# Patient Record
Sex: FEMALE | Race: ASIAN | NOT HISPANIC OR LATINO | URBAN - METROPOLITAN AREA
[De-identification: names, ages, dates, MRNs, and addresses within clinical notes are randomized per-mention and may not be internally consistent; named-entity substitution may affect disease eponyms.]

---

## 2019-01-06 ENCOUNTER — EMERGENCY (EMERGENCY)
Facility: HOSPITAL | Age: 29
LOS: 1 days | Discharge: HOME | End: 2019-01-06
Attending: EMERGENCY MEDICINE
Payer: COMMERCIAL

## 2019-01-06 VITALS
OXYGEN SATURATION: 100 % | DIASTOLIC BLOOD PRESSURE: 71 MMHG | TEMPERATURE: 98 F | HEART RATE: 76 BPM | SYSTOLIC BLOOD PRESSURE: 105 MMHG

## 2019-01-06 VITALS
TEMPERATURE: 98 F | RESPIRATION RATE: 20 BRPM | OXYGEN SATURATION: 100 % | HEART RATE: 75 BPM | DIASTOLIC BLOOD PRESSURE: 79 MMHG | SYSTOLIC BLOOD PRESSURE: 117 MMHG | HEIGHT: 62 IN | WEIGHT: 128.09 LBS

## 2019-01-06 LAB
ALBUMIN SERPL ELPH-MCNC: 4.6 G/DL — SIGNIFICANT CHANGE UP (ref 3.3–5)
ALP SERPL-CCNC: 86 U/L — SIGNIFICANT CHANGE UP (ref 40–120)
ALT FLD-CCNC: 15 U/L — SIGNIFICANT CHANGE UP (ref 10–45)
ANION GAP SERPL CALC-SCNC: 14 MMOL/L — SIGNIFICANT CHANGE UP (ref 5–17)
APPEARANCE UR: CLEAR — SIGNIFICANT CHANGE UP
APTT BLD: 30.1 SEC — SIGNIFICANT CHANGE UP (ref 27.5–36.3)
AST SERPL-CCNC: 13 U/L — SIGNIFICANT CHANGE UP (ref 10–40)
BACTERIA # UR AUTO: NEGATIVE — SIGNIFICANT CHANGE UP
BASOPHILS # BLD AUTO: 0 K/UL — SIGNIFICANT CHANGE UP (ref 0–0.2)
BASOPHILS NFR BLD AUTO: 0.3 % — SIGNIFICANT CHANGE UP (ref 0–2)
BILIRUB SERPL-MCNC: 0.2 MG/DL — SIGNIFICANT CHANGE UP (ref 0.2–1.2)
BILIRUB UR-MCNC: NEGATIVE — SIGNIFICANT CHANGE UP
BLD GP AB SCN SERPL QL: NEGATIVE — SIGNIFICANT CHANGE UP
BUN SERPL-MCNC: 8 MG/DL — SIGNIFICANT CHANGE UP (ref 7–23)
CALCIUM SERPL-MCNC: 9.5 MG/DL — SIGNIFICANT CHANGE UP (ref 8.4–10.5)
CHLORIDE SERPL-SCNC: 101 MMOL/L — SIGNIFICANT CHANGE UP (ref 96–108)
CO2 SERPL-SCNC: 23 MMOL/L — SIGNIFICANT CHANGE UP (ref 22–31)
COLOR SPEC: COLORLESS — SIGNIFICANT CHANGE UP
CREAT SERPL-MCNC: 0.57 MG/DL — SIGNIFICANT CHANGE UP (ref 0.5–1.3)
DIFF PNL FLD: ABNORMAL
EOSINOPHIL # BLD AUTO: 0.5 K/UL — SIGNIFICANT CHANGE UP (ref 0–0.5)
EOSINOPHIL NFR BLD AUTO: 4.7 % — SIGNIFICANT CHANGE UP (ref 0–6)
EPI CELLS # UR: 3 /HPF — SIGNIFICANT CHANGE UP
GLUCOSE SERPL-MCNC: 88 MG/DL — SIGNIFICANT CHANGE UP (ref 70–99)
GLUCOSE UR QL: NEGATIVE — SIGNIFICANT CHANGE UP
HCG SERPL-ACNC: 4622 MIU/ML — HIGH
HCG UR QL: POSITIVE
HCT VFR BLD CALC: 34.5 % — SIGNIFICANT CHANGE UP (ref 34.5–45)
HGB BLD-MCNC: 11.9 G/DL — SIGNIFICANT CHANGE UP (ref 11.5–15.5)
HYALINE CASTS # UR AUTO: 2 /LPF — SIGNIFICANT CHANGE UP (ref 0–2)
INR BLD: 0.95 RATIO — SIGNIFICANT CHANGE UP (ref 0.88–1.16)
KETONES UR-MCNC: NEGATIVE — SIGNIFICANT CHANGE UP
LEUKOCYTE ESTERASE UR-ACNC: ABNORMAL
LYMPHOCYTES # BLD AUTO: 3.3 K/UL — SIGNIFICANT CHANGE UP (ref 1–3.3)
LYMPHOCYTES # BLD AUTO: 34.3 % — SIGNIFICANT CHANGE UP (ref 13–44)
MCHC RBC-ENTMCNC: 31.4 PG — SIGNIFICANT CHANGE UP (ref 27–34)
MCHC RBC-ENTMCNC: 34.3 GM/DL — SIGNIFICANT CHANGE UP (ref 32–36)
MCV RBC AUTO: 91.5 FL — SIGNIFICANT CHANGE UP (ref 80–100)
MONOCYTES # BLD AUTO: 0.7 K/UL — SIGNIFICANT CHANGE UP (ref 0–0.9)
MONOCYTES NFR BLD AUTO: 7 % — SIGNIFICANT CHANGE UP (ref 2–14)
NEUTROPHILS # BLD AUTO: 5.2 K/UL — SIGNIFICANT CHANGE UP (ref 1.8–7.4)
NEUTROPHILS NFR BLD AUTO: 53.7 % — SIGNIFICANT CHANGE UP (ref 43–77)
NITRITE UR-MCNC: NEGATIVE — SIGNIFICANT CHANGE UP
PH UR: 6 — SIGNIFICANT CHANGE UP (ref 5–8)
PLATELET # BLD AUTO: 231 K/UL — SIGNIFICANT CHANGE UP (ref 150–400)
POTASSIUM SERPL-MCNC: 4 MMOL/L — SIGNIFICANT CHANGE UP (ref 3.5–5.3)
POTASSIUM SERPL-SCNC: 4 MMOL/L — SIGNIFICANT CHANGE UP (ref 3.5–5.3)
PROT SERPL-MCNC: 7.6 G/DL — SIGNIFICANT CHANGE UP (ref 6–8.3)
PROT UR-MCNC: NEGATIVE — SIGNIFICANT CHANGE UP
PROTHROM AB SERPL-ACNC: 10.9 SEC — SIGNIFICANT CHANGE UP (ref 10–12.9)
RBC # BLD: 3.77 M/UL — LOW (ref 3.8–5.2)
RBC # FLD: 12 % — SIGNIFICANT CHANGE UP (ref 10.3–14.5)
RBC CASTS # UR COMP ASSIST: 2 /HPF — SIGNIFICANT CHANGE UP (ref 0–4)
RH IG SCN BLD-IMP: POSITIVE — SIGNIFICANT CHANGE UP
SODIUM SERPL-SCNC: 138 MMOL/L — SIGNIFICANT CHANGE UP (ref 135–145)
SP GR SPEC: 1 — LOW (ref 1.01–1.02)
UROBILINOGEN FLD QL: NEGATIVE — SIGNIFICANT CHANGE UP
WBC # BLD: 9.6 K/UL — SIGNIFICANT CHANGE UP (ref 3.8–10.5)
WBC # FLD AUTO: 9.6 K/UL — SIGNIFICANT CHANGE UP (ref 3.8–10.5)
WBC UR QL: 12 /HPF — HIGH (ref 0–5)

## 2019-01-06 PROCEDURE — 86901 BLOOD TYPING SEROLOGIC RH(D): CPT

## 2019-01-06 PROCEDURE — 99284 EMERGENCY DEPT VISIT MOD MDM: CPT

## 2019-01-06 PROCEDURE — 85730 THROMBOPLASTIN TIME PARTIAL: CPT

## 2019-01-06 PROCEDURE — 81025 URINE PREGNANCY TEST: CPT

## 2019-01-06 PROCEDURE — 76817 TRANSVAGINAL US OBSTETRIC: CPT | Mod: 26

## 2019-01-06 PROCEDURE — 86850 RBC ANTIBODY SCREEN: CPT

## 2019-01-06 PROCEDURE — 76817 TRANSVAGINAL US OBSTETRIC: CPT

## 2019-01-06 PROCEDURE — 80053 COMPREHEN METABOLIC PANEL: CPT

## 2019-01-06 PROCEDURE — 85610 PROTHROMBIN TIME: CPT

## 2019-01-06 PROCEDURE — 86900 BLOOD TYPING SEROLOGIC ABO: CPT

## 2019-01-06 PROCEDURE — 84702 CHORIONIC GONADOTROPIN TEST: CPT

## 2019-01-06 PROCEDURE — 87086 URINE CULTURE/COLONY COUNT: CPT

## 2019-01-06 PROCEDURE — 81001 URINALYSIS AUTO W/SCOPE: CPT

## 2019-01-06 PROCEDURE — 85027 COMPLETE CBC AUTOMATED: CPT

## 2019-01-06 RX ORDER — CEPHALEXIN 500 MG
500 CAPSULE ORAL ONCE
Qty: 0 | Refills: 0 | Status: COMPLETED | OUTPATIENT
Start: 2019-01-06 | End: 2019-01-06

## 2019-01-06 RX ORDER — CEPHALEXIN 500 MG
1 CAPSULE ORAL
Qty: 14 | Refills: 0
Start: 2019-01-06 | End: 2019-01-12

## 2019-01-06 RX ADMIN — Medication 500 MILLIGRAM(S): at 20:22

## 2019-01-06 NOTE — ED PROVIDER NOTE - OBJECTIVE STATEMENT
27 y/o F  6 weeks pregnant by positive home pregnancy test, presenting for concern for spotting 2 days ago and spotting today. States that she does not know exactly when her lmp was because her periods are very irregular; however, she does know that she had a negative pregnancy test on 12/3 and a positive pregnancy test on . She reports that she has had her hcg checked twice since her positive pregnancy test and it is appropriately doubling, and had an US on  for dating; however, does not have the results of it yet. She states spotting was 'only a few drops' 2 days ago and again today 'even less.' She denies any lightheadedness, nausea, vomiting. Denies any pelvic pain but reports some mild lower back pain.

## 2019-01-06 NOTE — ED ADULT NURSE NOTE - OBJECTIVE STATEMENT
Pt is a 27 yo female  6 weeks pregnant by positive home pregnancy test, presenting for concern for spotting 2 days ago and spotting today. States that she does not know exactly when her lmp was because her periods are very irregular; however, she does know that she had a negative pregnancy test on 12/3 and a positive pregnancy test on . She reports that she has had her hcg checked twice since her positive pregnancy test and it is appropriately doubling, and had an US on  for dating; however, does not have the results of it yet. She states spotting was 'only a few drops' 2 days ago and again today 'even less.' She denies any lightheadedness, nausea, vomiting. Denies any pelvic pain but reports some mild lower back pain. Pt is a 27 yo female  who is estimated to be about 6 weeks pregnant by positive home pregnancy test, Pt states that two days ago she had light spotting which self resolved and then it returned again today. Pt denies any pain to tenderness no Cp or SOB no N/V/D no cough fever or chills no dizziness or headaches, Pt unsure of her lmp because her periods are very irregular; however, she does know that she had a negative pregnancy test on 12/3 and a positive pregnancy test on . She reports that she has had her hcg checked twice since her positive pregnancy test and it is appropriately doubling,

## 2019-01-06 NOTE — ED PROVIDER NOTE - NSFOLLOWUPINSTRUCTIONS_ED_ALL_ED_FT
You were seen in the ED for vaginal bleeding, your ultrasound suggests likely a very early pregnancy. You need to follow up with your OBGYN for a re-evaluation in 2 days.     Please complete the antibiotics as prescribed.    Return to the ED immediately if you are soaking through more than 1 pad per hour, feel lightheaded, feel like you are about to pass out or develop chest pain or trouble breathing. These are signs of life-threatening bleeding that would warrant immediate evaluation. Please follow up with your regular doctor or gynecologist in the next 1-2 days to ensure your bleeding is improving.

## 2019-01-06 NOTE — ED PROVIDER NOTE - ATTENDING CONTRIBUTION TO CARE
Attending MD Neff:  I personally have seen and examined this patient.  Resident note reviewed and agree on plan of care and except where noted.  See HPI, PE, and MDM for details.       Attending MD Neff:    Gen:  well appearing, oriented x 3  Neck: supple, no swelling, trachea midline  CV: heart with reg rhythm, no obvious murmur appreciated   Resp: CTAB, breathing comfortably  Abd: soft, NT, ND  Extremities: extremities warm to the touch, no peripheral edema   Msk: no extremity deformities or bony tenderness  Pysch: appropriate affect    Neuro: moves all extremities spontaneously, no gross motor or sensory deficits       29 y/o F  6 weeks pregnant presenting with vaginal bleeding, plan for labs, TVUS to eval IUP ro ectopic Attending MD Neff:   I personally have seen and examined this patient.  Physician assistant note reviewed and agree on plan of care and except where noted.  See HPI, PE, and MDM for details.      Attending MD Neff:    Gen:  well appearing, oriented x 3  Neck: supple, no swelling, trachea midline  CV: heart with reg rhythm, no obvious murmur appreciated   Resp: CTAB, breathing comfortably  Abd: soft, NT, ND  Extremities: extremities warm to the touch, no peripheral edema   Msk: no extremity deformities or bony tenderness  Pysch: appropriate affect    Neuro: moves all extremities spontaneously, no gross motor or sensory deficits       27 y/o F  6 weeks pregnant presenting with vaginal bleeding, plan for labs, TVUS to eval IUP ro ectopic

## 2019-01-06 NOTE — ED PROVIDER NOTE - NSFOLLOWUPCLINICS_GEN_ALL_ED_FT
[facility]  Gynceology/OB  [address] [place] [address]  Phone: [phone]  Fax:   Follow Up Time: 1-3 Days

## 2019-01-06 NOTE — ED ADULT NURSE NOTE - NSIMPLEMENTINTERV_GEN_ALL_ED
Implemented All Universal Safety Interventions:  [place] to call system. Call bell, personal items and telephone within reach. Instruct patient to call for assistance. Room bathroom lighting operational. Non-slip footwear when patient is off stretcher. Physically safe environment: no spills, clutter or unnecessary equipment. Stretcher in lowest position, wheels locked, appropriate side rails in place.

## 2019-01-06 NOTE — ED PROVIDER NOTE - NS ED ROS FT
Constitutional: No fever or chills  Eyes: No visual changes, eye pain or redness  HEENT: No throat pain, ear pain, nasal pain. No nose bleeding.  CV: No chest pain or lower extremity edema  Resp: No SOB no cough  GI: No abd pain. No nausea or vomiting. No diarrhea. No constipation.   : No dysuria, hematuria. vaginal spotting  MSK: No musculoskeletal pain  Skin: No rash  Neuro: No headache. No numbness or tingling. No weakness.

## 2019-01-07 LAB
CULTURE RESULTS: NO GROWTH — SIGNIFICANT CHANGE UP
SPECIMEN SOURCE: SIGNIFICANT CHANGE UP

## 2019-01-08 ENCOUNTER — APPOINTMENT (OUTPATIENT)
Dept: OBGYN | Facility: CLINIC | Age: 29
End: 2019-01-08
Payer: COMMERCIAL

## 2019-01-08 VITALS
SYSTOLIC BLOOD PRESSURE: 100 MMHG | HEIGHT: 62 IN | BODY MASS INDEX: 23.6 KG/M2 | DIASTOLIC BLOOD PRESSURE: 60 MMHG | WEIGHT: 128.25 LBS

## 2019-01-08 DIAGNOSIS — Z83.3 FAMILY HISTORY OF DIABETES MELLITUS: ICD-10-CM

## 2019-01-08 DIAGNOSIS — Z00.00 ENCOUNTER FOR GENERAL ADULT MEDICAL EXAMINATION W/OUT ABNORMAL FINDINGS: ICD-10-CM

## 2019-01-08 DIAGNOSIS — Z82.49 FAMILY HISTORY OF ISCHEMIC HEART DISEASE AND OTHER DISEASES OF THE CIRCULATORY SYSTEM: ICD-10-CM

## 2019-01-08 DIAGNOSIS — E78.00 PURE HYPERCHOLESTEROLEMIA, UNSPECIFIED: ICD-10-CM

## 2019-01-08 DIAGNOSIS — Z78.9 OTHER SPECIFIED HEALTH STATUS: ICD-10-CM

## 2019-01-08 DIAGNOSIS — Z83.2 FAMILY HISTORY OF DISEASES OF THE BLOOD AND BLOOD-FORMING ORGANS AND CERTAIN DISORDERS INVOLVING THE IMMUNE MECHANISM: ICD-10-CM

## 2019-01-08 LAB
HCG UR QL: POSITIVE
QUALITY CONTROL: NORMAL

## 2019-01-08 PROCEDURE — 99203 OFFICE O/P NEW LOW 30 MIN: CPT

## 2019-01-08 RX ORDER — PRENATAL WITH FERROUS FUM AND FOLIC ACID 3080; 920; 120; 400; 22; 1.84; 3; 20; 10; 1; 12; 200; 27; 25; 2 [IU]/1; [IU]/1; MG/1; [IU]/1; MG/1; MG/1; MG/1; MG/1; MG/1; MG/1; UG/1; MG/1; MG/1; MG/1; MG/1
27-1 TABLET ORAL
Refills: 0 | Status: ACTIVE | COMMUNITY

## 2019-01-08 RX ORDER — ACETAMINOPHEN 325 MG
TABLET ORAL
Refills: 0 | Status: ACTIVE | COMMUNITY

## 2019-01-08 NOTE — PHYSICAL EXAM
[Awake] : awake [Alert] : alert [Acute Distress] : no acute distress [LAD] : no lymphadenopathy [Mass] : no breast mass [Nipple Discharge] : no nipple discharge [Axillary LAD] : no axillary lymphadenopathy [Soft] : soft [Tender] : non tender [Oriented x3] : oriented to person, place, and time [Normal] : uterus [Scant] : there was scant vaginal bleeding [Motion Tenderness] : there was no cervical motion tenderness [Uterine Adnexae] : were not tender and not enlarged [Adnexa Tenderness] : were not tender [Ovarian Mass (___ Cm)] : there were no adnexal masses

## 2019-01-09 LAB
ABO + RH PNL BLD: NORMAL
BLD GP AB SCN SERPL QL: NORMAL
C TRACH RRNA SPEC QL NAA+PROBE: NOT DETECTED
N GONORRHOEA RRNA SPEC QL NAA+PROBE: NOT DETECTED
PROGEST SERPL-MCNC: 3.3 NG/ML
SOURCE AMPLIFICATION: NORMAL

## 2019-01-11 LAB — HCG SERPL-MCNC: 4347 MIU/ML

## 2019-01-24 ENCOUNTER — APPOINTMENT (OUTPATIENT)
Dept: OBGYN | Facility: CLINIC | Age: 29
End: 2019-01-24
Payer: COMMERCIAL

## 2019-01-24 ENCOUNTER — TRANSCRIPTION ENCOUNTER (OUTPATIENT)
Age: 29
End: 2019-01-24

## 2019-01-24 VITALS
SYSTOLIC BLOOD PRESSURE: 112 MMHG | WEIGHT: 128 LBS | HEIGHT: 62 IN | DIASTOLIC BLOOD PRESSURE: 72 MMHG | BODY MASS INDEX: 23.55 KG/M2

## 2019-01-24 PROCEDURE — 99214 OFFICE O/P EST MOD 30 MIN: CPT

## 2019-01-24 NOTE — PROCEDURE
[Intrauterine Pregnancy] : no intrauterine pregnancy [Yolk Sac] : no yolk sac [Fetal Heart] : no fetal heart [FreeTextEntry1] : small debris and fluid\par endo stirp max w fluid 7mm

## 2019-01-24 NOTE — PHYSICAL EXAM
[Awake] : awake [Alert] : alert [Acute Distress] : no acute distress [Soft] : soft [Tender] : non tender [Oriented x3] : oriented to person, place, and time [Normal] : uterus [No Bleeding] : there was no active vaginal bleeding [Tenderness] : nontender [Uterine Adnexae] : were not tender and not enlarged [Adnexa Tenderness] : were not tender [FreeTextEntry5] : small tissue--macerated at os removed (old w clot)

## 2019-01-25 LAB — HCG SERPL-MCNC: 737 MIU/ML

## 2019-01-28 ENCOUNTER — RESULT REVIEW (OUTPATIENT)
Age: 29
End: 2019-01-28

## 2019-01-28 ENCOUNTER — RESULT CHARGE (OUTPATIENT)
Age: 29
End: 2019-01-28

## 2019-05-22 LAB
ESTIMATED AVERAGE GLUCOSE: 108 MG/DL
ESTRADIOL SERPL-MCNC: 24 PG/ML
FSH SERPL-MCNC: 8 IU/L
HBA1C MFR BLD HPLC: 5.4 %
LH SERPL-ACNC: 7.4 IU/L
PROLACTIN SERPL-MCNC: 21.6 NG/ML
TESTOST SERPL-MCNC: 20.7 NG/DL

## 2019-05-28 ENCOUNTER — TRANSCRIPTION ENCOUNTER (OUTPATIENT)
Age: 29
End: 2019-05-28

## 2019-06-04 LAB — DHEA-SULFATE, SERUM: 223 UG/DL

## 2019-06-18 RX ORDER — MISOPROSTOL 200 UG/1
200 TABLET ORAL
Qty: 4 | Refills: 0 | Status: COMPLETED | COMMUNITY
Start: 2019-01-24 | End: 2019-06-18

## 2019-06-24 ENCOUNTER — CHART COPY (OUTPATIENT)
Age: 29
End: 2019-06-24

## 2019-06-30 LAB
HCG SERPL-MCNC: ABNORMAL MIU/ML
PROGEST SERPL-MCNC: 20.3 NG/ML

## 2019-07-03 ENCOUNTER — LABORATORY RESULT (OUTPATIENT)
Age: 29
End: 2019-07-03

## 2019-07-03 ENCOUNTER — APPOINTMENT (OUTPATIENT)
Dept: OBGYN | Facility: CLINIC | Age: 29
End: 2019-07-03
Payer: COMMERCIAL

## 2019-07-03 VITALS
BODY MASS INDEX: 23.03 KG/M2 | WEIGHT: 125.13 LBS | DIASTOLIC BLOOD PRESSURE: 83 MMHG | HEART RATE: 91 BPM | SYSTOLIC BLOOD PRESSURE: 122 MMHG | HEIGHT: 62 IN

## 2019-07-03 LAB
BILIRUB UR QL STRIP: NORMAL
GLUCOSE UR-MCNC: NORMAL
HCG UR QL: 0.2 EU/DL
HGB UR QL STRIP.AUTO: NORMAL
KETONES UR-MCNC: NORMAL
LEUKOCYTE ESTERASE UR QL STRIP: NORMAL
NITRITE UR QL STRIP: NORMAL
PH UR STRIP: 5.5
PROT UR STRIP-MCNC: NORMAL
SP GR UR STRIP: 1.01

## 2019-07-03 PROCEDURE — 81002 URINALYSIS NONAUTO W/O SCOPE: CPT

## 2019-07-03 PROCEDURE — 99214 OFFICE O/P EST MOD 30 MIN: CPT

## 2019-07-03 NOTE — PHYSICAL EXAM
[Awake] : awake [Alert] : alert [Soft] : soft [Oriented x3] : oriented to person, place, and time [Normal] : uterus [Uterine Adnexae] : were not tender and not enlarged [No Bleeding] : there was no active vaginal bleeding [Acute Distress] : no acute distress [Tender] : non tender

## 2019-07-07 LAB
ABO + RH PNL BLD: NORMAL
ALBUMIN SERPL ELPH-MCNC: 4.7 G/DL
ALP BLD-CCNC: 75 U/L
ALT SERPL-CCNC: 12 U/L
ANION GAP SERPL CALC-SCNC: 14 MMOL/L
AST SERPL-CCNC: 15 U/L
B19V IGG SER QL IA: 0.2 INDEX
B19V IGG+IGM SER-IMP: NEGATIVE
B19V IGG+IGM SER-IMP: NORMAL
B19V IGM FLD-ACNC: 0.4
B19V IGM SER-ACNC: NEGATIVE
BACTERIA UR CULT: NORMAL
BASOPHILS # BLD AUTO: 0.03 K/UL
BASOPHILS NFR BLD AUTO: 0.3 %
BILIRUB SERPL-MCNC: 0.3 MG/DL
BLD GP AB SCN SERPL QL: NORMAL
BUN SERPL-MCNC: 8 MG/DL
C TRACH RRNA SPEC QL NAA+PROBE: NOT DETECTED
CALCIUM SERPL-MCNC: 9.7 MG/DL
CHLORIDE SERPL-SCNC: 102 MMOL/L
CMV IGG SERPL QL: 7.9 U/ML
CMV IGG SERPL-IMP: POSITIVE
CO2 SERPL-SCNC: 22 MMOL/L
CREAT SERPL-MCNC: 0.59 MG/DL
EOSINOPHIL # BLD AUTO: 0.09 K/UL
EOSINOPHIL NFR BLD AUTO: 0.8 %
ESTIMATED AVERAGE GLUCOSE: 105 MG/DL
GLUCOSE SERPL-MCNC: 86 MG/DL
HBA1C MFR BLD HPLC: 5.3 %
HBV SURFACE AG SER QL: NONREACTIVE
HCT VFR BLD CALC: 36.4 %
HCV AB SER QL: NONREACTIVE
HCV S/CO RATIO: 0.2 S/CO
HGB BLD-MCNC: 11.3 G/DL
HIV1+2 AB SPEC QL IA.RAPID: NONREACTIVE
IMM GRANULOCYTES NFR BLD AUTO: 0.3 %
LEAD BLD-MCNC: 1 UG/DL
LYMPHOCYTES # BLD AUTO: 1.53 K/UL
LYMPHOCYTES NFR BLD AUTO: 12.8 %
MAN DIFF?: NORMAL
MCHC RBC-ENTMCNC: 30.3 PG
MCHC RBC-ENTMCNC: 31 GM/DL
MCV RBC AUTO: 97.6 FL
MEV IGG FLD QL IA: >300 AU/ML
MEV IGG+IGM SER-IMP: POSITIVE
MONOCYTES # BLD AUTO: 0.69 K/UL
MONOCYTES NFR BLD AUTO: 5.8 %
N GONORRHOEA RRNA SPEC QL NAA+PROBE: NOT DETECTED
NEUTROPHILS # BLD AUTO: 9.54 K/UL
NEUTROPHILS NFR BLD AUTO: 80 %
PLATELET # BLD AUTO: 246 K/UL
POTASSIUM SERPL-SCNC: 4.5 MMOL/L
PROT SERPL-MCNC: 7.6 G/DL
RBC # BLD: 3.73 M/UL
RBC # FLD: 12.9 %
RUBV IGG FLD-ACNC: 6.5 INDEX
RUBV IGG SER-IMP: POSITIVE
SODIUM SERPL-SCNC: 138 MMOL/L
SOURCE AMPLIFICATION: NORMAL
T PALLIDUM AB SER QL IA: NEGATIVE
VZV AB TITR SER: POSITIVE
VZV IGG SER IF-ACNC: 1209 INDEX
WBC # FLD AUTO: 11.92 K/UL

## 2019-07-08 ENCOUNTER — OTHER (OUTPATIENT)
Age: 29
End: 2019-07-08

## 2019-07-08 ENCOUNTER — LABORATORY RESULT (OUTPATIENT)
Age: 29
End: 2019-07-08

## 2019-07-08 ENCOUNTER — MOBILE ON CALL (OUTPATIENT)
Age: 29
End: 2019-07-08

## 2019-07-12 DIAGNOSIS — E03.9 HYPOTHYROIDISM, UNSPECIFIED: ICD-10-CM

## 2019-07-12 LAB
AR GENE MUT ANL BLD/T: NEGATIVE
CFTR MUT TESTED BLD/T: NEGATIVE
FMR1 GENE MUT ANL BLD/T: NORMAL
HGB A MFR BLD: 97.4 %
HGB A2 MFR BLD: 2.6 %
HGB FRACT BLD-IMP: NORMAL
T3 SERPL-MCNC: 151 NG/DL
T3FREE SERPL-MCNC: 3.12 PG/ML
T3RU NFR SERPL: 1.2 TBI
T4 FREE SERPL-MCNC: 1.1 NG/DL
T4 SERPL-MCNC: 8.3 UG/DL
THYROGLOB AB SERPL-ACNC: <20 IU/ML
THYROPEROXIDASE AB SERPL IA-ACNC: <10 IU/ML
TSH SERPL-ACNC: 3.69 UIU/ML
TSH SERPL-ACNC: 5.07 UIU/ML

## 2019-07-12 RX ORDER — LEVOTHYROXINE SODIUM 0.03 MG/1
25 TABLET ORAL DAILY
Qty: 90 | Refills: 2 | Status: ACTIVE | COMMUNITY
Start: 2019-07-12 | End: 1900-01-01

## 2019-08-01 ENCOUNTER — APPOINTMENT (OUTPATIENT)
Dept: OBGYN | Facility: CLINIC | Age: 29
End: 2019-08-01
Payer: COMMERCIAL

## 2019-08-01 VITALS
HEIGHT: 62 IN | SYSTOLIC BLOOD PRESSURE: 114 MMHG | BODY MASS INDEX: 23.79 KG/M2 | WEIGHT: 129.25 LBS | DIASTOLIC BLOOD PRESSURE: 76 MMHG

## 2019-08-01 PROCEDURE — ZZZZZ: CPT

## 2019-08-14 ENCOUNTER — ASOB RESULT (OUTPATIENT)
Age: 29
End: 2019-08-14

## 2019-08-14 ENCOUNTER — APPOINTMENT (OUTPATIENT)
Dept: ANTEPARTUM | Facility: CLINIC | Age: 29
End: 2019-08-14
Payer: COMMERCIAL

## 2019-08-14 PROCEDURE — 76813 OB US NUCHAL MEAS 1 GEST: CPT

## 2019-08-14 PROCEDURE — 36416 COLLJ CAPILLARY BLOOD SPEC: CPT

## 2019-08-14 PROCEDURE — 76801 OB US < 14 WKS SINGLE FETUS: CPT

## 2019-08-29 ENCOUNTER — OTHER (OUTPATIENT)
Age: 29
End: 2019-08-29

## 2019-09-03 ENCOUNTER — APPOINTMENT (OUTPATIENT)
Dept: OBGYN | Facility: CLINIC | Age: 29
End: 2019-09-03

## 2019-09-03 ENCOUNTER — NON-APPOINTMENT (OUTPATIENT)
Age: 29
End: 2019-09-03

## 2019-09-11 ENCOUNTER — APPOINTMENT (OUTPATIENT)
Dept: OBGYN | Facility: CLINIC | Age: 29
End: 2019-09-11
Payer: COMMERCIAL

## 2019-09-11 ENCOUNTER — NON-APPOINTMENT (OUTPATIENT)
Age: 29
End: 2019-09-11

## 2019-09-11 VITALS — DIASTOLIC BLOOD PRESSURE: 72 MMHG | BODY MASS INDEX: 22.5 KG/M2 | WEIGHT: 123 LBS | SYSTOLIC BLOOD PRESSURE: 109 MMHG

## 2019-09-11 PROCEDURE — 0502F SUBSEQUENT PRENATAL CARE: CPT

## 2019-09-11 RX ORDER — CLOTRIMAZOLE AND BETAMETHASONE DIPROPIONATE 10; .5 MG/G; MG/G
1-0.05 CREAM TOPICAL TWICE DAILY
Qty: 1 | Refills: 1 | Status: ACTIVE | COMMUNITY
Start: 2019-09-11 | End: 1900-01-01

## 2019-09-16 LAB
1ST TRIMESTER DATA: NORMAL
2ND TRIMESTER DATA: NORMAL
AFP PNL SERPL: NORMAL
AFP SERPL-ACNC: NORMAL
AFP SERPL-ACNC: NORMAL
B-HCG FREE SERPL-MCNC: NORMAL
CLINICAL BIOCHEMIST REVIEW: NORMAL
FREE BETA HCG 1ST TRIMESTER: NORMAL
INHIBIN A SERPL-MCNC: NORMAL
NOTES NTD: NORMAL
NT: NORMAL
PAPP-A SERPL-ACNC: NORMAL
U ESTRIOL SERPL-SCNC: NORMAL

## 2019-09-23 LAB
CLARI ADDITIONAL INFO: NORMAL
CLARI CHROMOSOME 13: NORMAL
CLARI CHROMOSOME 18: NORMAL
CLARI CHROMOSOME 21: NORMAL
CLARI SEX CHROMOSOMES: NORMAL
CLARITEST NIPT: NORMAL

## 2019-10-09 ENCOUNTER — APPOINTMENT (OUTPATIENT)
Dept: OBGYN | Facility: CLINIC | Age: 29
End: 2019-10-09
Payer: COMMERCIAL

## 2019-10-09 ENCOUNTER — MED ADMIN CHARGE (OUTPATIENT)
Age: 29
End: 2019-10-09

## 2019-10-09 VITALS
WEIGHT: 128 LBS | DIASTOLIC BLOOD PRESSURE: 58 MMHG | BODY MASS INDEX: 23.55 KG/M2 | HEIGHT: 62 IN | SYSTOLIC BLOOD PRESSURE: 100 MMHG

## 2019-10-09 PROCEDURE — 90471 IMMUNIZATION ADMIN: CPT

## 2019-10-09 PROCEDURE — 0502F SUBSEQUENT PRENATAL CARE: CPT

## 2019-10-09 PROCEDURE — 90686 IIV4 VACC NO PRSV 0.5 ML IM: CPT

## 2019-10-09 RX ORDER — TERCONAZOLE 8 MG/G
0.8 CREAM VAGINAL
Qty: 1 | Refills: 2 | Status: ACTIVE | COMMUNITY
Start: 2019-10-09 | End: 1900-01-01

## 2019-10-10 ENCOUNTER — APPOINTMENT (OUTPATIENT)
Dept: ANTEPARTUM | Facility: CLINIC | Age: 29
End: 2019-10-10
Payer: COMMERCIAL

## 2019-10-10 ENCOUNTER — ASOB RESULT (OUTPATIENT)
Age: 29
End: 2019-10-10

## 2019-10-10 ENCOUNTER — APPOINTMENT (OUTPATIENT)
Dept: ANTEPARTUM | Facility: CLINIC | Age: 29
End: 2019-10-10

## 2019-10-10 PROCEDURE — 76817 TRANSVAGINAL US OBSTETRIC: CPT

## 2019-10-10 PROCEDURE — 76811 OB US DETAILED SNGL FETUS: CPT

## 2019-10-13 ENCOUNTER — NON-APPOINTMENT (OUTPATIENT)
Age: 29
End: 2019-10-13

## 2019-11-06 ENCOUNTER — APPOINTMENT (OUTPATIENT)
Dept: OBGYN | Facility: CLINIC | Age: 29
End: 2019-11-06

## 2019-11-18 ENCOUNTER — APPOINTMENT (OUTPATIENT)
Dept: OBGYN | Facility: CLINIC | Age: 29
End: 2019-11-18
Payer: COMMERCIAL

## 2019-11-18 VITALS
DIASTOLIC BLOOD PRESSURE: 62 MMHG | SYSTOLIC BLOOD PRESSURE: 110 MMHG | WEIGHT: 145 LBS | HEIGHT: 62 IN | BODY MASS INDEX: 26.68 KG/M2

## 2019-11-18 PROCEDURE — 0502F SUBSEQUENT PRENATAL CARE: CPT

## 2019-11-19 ENCOUNTER — TRANSCRIPTION ENCOUNTER (OUTPATIENT)
Age: 29
End: 2019-11-19

## 2019-11-19 ENCOUNTER — ASOB RESULT (OUTPATIENT)
Age: 29
End: 2019-11-19

## 2019-11-19 ENCOUNTER — APPOINTMENT (OUTPATIENT)
Dept: MATERNAL FETAL MEDICINE | Facility: CLINIC | Age: 29
End: 2019-11-19
Payer: COMMERCIAL

## 2019-11-19 DIAGNOSIS — Z87.59 PERSONAL HISTORY OF OTHER COMPLICATIONS OF PREGNANCY, CHILDBIRTH AND THE PUERPERIUM: ICD-10-CM

## 2019-11-19 DIAGNOSIS — O02.1 MISSED ABORTION: ICD-10-CM

## 2019-11-19 DIAGNOSIS — N76.2 ACUTE VULVITIS: ICD-10-CM

## 2019-11-19 DIAGNOSIS — O02.0 BLIGHTED OVUM AND NONHYDATIDIFORM MOLE: ICD-10-CM

## 2019-11-19 PROCEDURE — G0109 DIAB MANAGE TRN IND/GROUP: CPT

## 2019-11-20 ENCOUNTER — NON-APPOINTMENT (OUTPATIENT)
Age: 29
End: 2019-11-20

## 2019-11-20 LAB
BASOPHILS # BLD AUTO: 0.03 K/UL
BASOPHILS NFR BLD AUTO: 0.3 %
EOSINOPHIL # BLD AUTO: 0.2 K/UL
EOSINOPHIL NFR BLD AUTO: 1.8 %
GLUCOSE 1H P 50 G GLC PO SERPL-MCNC: 214 MG/DL
HCT VFR BLD CALC: 31.9 %
HGB BLD-MCNC: 9.8 G/DL
IMM GRANULOCYTES NFR BLD AUTO: 1.2 %
LYMPHOCYTES # BLD AUTO: 2.23 K/UL
LYMPHOCYTES NFR BLD AUTO: 19.9 %
MAN DIFF?: NORMAL
MCHC RBC-ENTMCNC: 30.6 PG
MCHC RBC-ENTMCNC: 30.7 GM/DL
MCV RBC AUTO: 99.7 FL
MONOCYTES # BLD AUTO: 0.67 K/UL
MONOCYTES NFR BLD AUTO: 6 %
NEUTROPHILS # BLD AUTO: 7.94 K/UL
NEUTROPHILS NFR BLD AUTO: 70.8 %
PLATELET # BLD AUTO: 227 K/UL
RBC # BLD: 3.2 M/UL
RBC # FLD: 13.2 %
WBC # FLD AUTO: 11.2 K/UL

## 2019-11-20 RX ORDER — BLOOD-GLUCOSE METER
W/DEVICE KIT MISCELLANEOUS
Qty: 1 | Refills: 0 | Status: ACTIVE | COMMUNITY
Start: 2019-11-19 | End: 1900-01-01

## 2019-11-20 RX ORDER — BLOOD-GLUCOSE METER
KIT MISCELLANEOUS
Qty: 1 | Refills: 5 | Status: ACTIVE | COMMUNITY
Start: 2019-11-19 | End: 1900-01-01

## 2019-11-20 RX ORDER — URINE ACETONE TEST STRIPS
STRIP MISCELLANEOUS
Qty: 1 | Refills: 0 | Status: ACTIVE | COMMUNITY
Start: 2019-11-19 | End: 1900-01-01

## 2019-11-20 RX ORDER — LANCETS 33 GAUGE
EACH MISCELLANEOUS
Qty: 1 | Refills: 2 | Status: ACTIVE | COMMUNITY
Start: 2019-11-19 | End: 1900-01-01

## 2019-12-04 ENCOUNTER — APPOINTMENT (OUTPATIENT)
Dept: MATERNAL FETAL MEDICINE | Facility: CLINIC | Age: 29
End: 2019-12-04
Payer: COMMERCIAL

## 2019-12-04 PROCEDURE — G0109 DIAB MANAGE TRN IND/GROUP: CPT

## 2019-12-09 ENCOUNTER — APPOINTMENT (OUTPATIENT)
Dept: OBGYN | Facility: CLINIC | Age: 29
End: 2019-12-09
Payer: COMMERCIAL

## 2019-12-09 VITALS
BODY MASS INDEX: 26.31 KG/M2 | WEIGHT: 143 LBS | SYSTOLIC BLOOD PRESSURE: 102 MMHG | HEIGHT: 62 IN | DIASTOLIC BLOOD PRESSURE: 68 MMHG

## 2019-12-09 DIAGNOSIS — Z78.9 OTHER SPECIFIED HEALTH STATUS: ICD-10-CM

## 2019-12-09 DIAGNOSIS — Z34.90 ENCOUNTER FOR SUPERVISION OF NORMAL PREGNANCY, UNSPECIFIED, UNSPECIFIED TRIMESTER: ICD-10-CM

## 2019-12-09 PROCEDURE — 0502F SUBSEQUENT PRENATAL CARE: CPT

## 2019-12-10 LAB
HIV1+2 AB SPEC QL IA.RAPID: NONREACTIVE
T3FREE SERPL-MCNC: 3.04 PG/ML
T4 FREE SERPL-MCNC: 0.8 NG/DL
TSH SERPL-ACNC: 2.14 UIU/ML

## 2019-12-12 ENCOUNTER — ASOB RESULT (OUTPATIENT)
Age: 29
End: 2019-12-12

## 2019-12-12 ENCOUNTER — APPOINTMENT (OUTPATIENT)
Dept: ANTEPARTUM | Facility: CLINIC | Age: 29
End: 2019-12-12
Payer: COMMERCIAL

## 2019-12-12 PROCEDURE — 76816 OB US FOLLOW-UP PER FETUS: CPT

## 2019-12-13 ENCOUNTER — APPOINTMENT (OUTPATIENT)
Dept: MATERNAL FETAL MEDICINE | Facility: CLINIC | Age: 29
End: 2019-12-13
Payer: COMMERCIAL

## 2019-12-13 ENCOUNTER — ASOB RESULT (OUTPATIENT)
Age: 29
End: 2019-12-13

## 2019-12-13 PROCEDURE — G0108 DIAB MANAGE TRN  PER INDIV: CPT

## 2019-12-26 ENCOUNTER — APPOINTMENT (OUTPATIENT)
Dept: OBGYN | Facility: CLINIC | Age: 29
End: 2019-12-26
Payer: COMMERCIAL

## 2019-12-26 ENCOUNTER — OTHER (OUTPATIENT)
Age: 29
End: 2019-12-26

## 2019-12-26 ENCOUNTER — MED ADMIN CHARGE (OUTPATIENT)
Age: 29
End: 2019-12-26

## 2019-12-26 VITALS
DIASTOLIC BLOOD PRESSURE: 68 MMHG | HEIGHT: 62 IN | BODY MASS INDEX: 26.31 KG/M2 | SYSTOLIC BLOOD PRESSURE: 103 MMHG | WEIGHT: 143 LBS

## 2019-12-26 PROCEDURE — 90715 TDAP VACCINE 7 YRS/> IM: CPT

## 2019-12-26 PROCEDURE — 90471 IMMUNIZATION ADMIN: CPT

## 2019-12-26 PROCEDURE — 0502F SUBSEQUENT PRENATAL CARE: CPT

## 2020-01-03 ENCOUNTER — ASOB RESULT (OUTPATIENT)
Age: 30
End: 2020-01-03

## 2020-01-03 ENCOUNTER — APPOINTMENT (OUTPATIENT)
Dept: MATERNAL FETAL MEDICINE | Facility: CLINIC | Age: 30
End: 2020-01-03
Payer: COMMERCIAL

## 2020-01-03 ENCOUNTER — APPOINTMENT (OUTPATIENT)
Dept: ANTEPARTUM | Facility: CLINIC | Age: 30
End: 2020-01-03
Payer: COMMERCIAL

## 2020-01-03 PROCEDURE — 76816 OB US FOLLOW-UP PER FETUS: CPT

## 2020-01-03 PROCEDURE — G0108 DIAB MANAGE TRN  PER INDIV: CPT

## 2020-01-03 RX ORDER — PEN NEEDLE, DIABETIC 29 G X1/2"
32G X 4 MM NEEDLE, DISPOSABLE MISCELLANEOUS
Qty: 1 | Refills: 1 | Status: ACTIVE | COMMUNITY
Start: 2020-01-03 | End: 1900-01-01

## 2020-01-03 RX ORDER — INSULIN LISPRO 100 [IU]/ML
100 INJECTION, SOLUTION INTRAVENOUS; SUBCUTANEOUS
Qty: 1 | Refills: 2 | Status: ACTIVE | COMMUNITY
Start: 2020-01-03 | End: 1900-01-01

## 2020-01-13 ENCOUNTER — APPOINTMENT (OUTPATIENT)
Dept: OBGYN | Facility: CLINIC | Age: 30
End: 2020-01-13
Payer: COMMERCIAL

## 2020-01-13 VITALS — SYSTOLIC BLOOD PRESSURE: 98 MMHG | DIASTOLIC BLOOD PRESSURE: 50 MMHG | WEIGHT: 144 LBS | BODY MASS INDEX: 26.34 KG/M2

## 2020-01-13 DIAGNOSIS — M41.119 JUVENILE IDIOPATHIC SCOLIOSIS, SITE UNSPECIFIED: ICD-10-CM

## 2020-01-13 DIAGNOSIS — Z87.39 PERSONAL HISTORY OF OTHER DISEASES OF THE MUSCULOSKELETAL SYSTEM AND CONNECTIVE TISSUE: ICD-10-CM

## 2020-01-13 PROCEDURE — 99080 SPECIAL REPORTS OR FORMS: CPT

## 2020-01-13 PROCEDURE — 0502F SUBSEQUENT PRENATAL CARE: CPT

## 2020-01-16 ENCOUNTER — NON-APPOINTMENT (OUTPATIENT)
Age: 30
End: 2020-01-16

## 2020-01-16 PROBLEM — M41.119 JUVENILE IDIOPATHIC SCOLIOSIS, UNSPECIFIED SPINAL REGION: Status: ACTIVE | Noted: 2019-01-08

## 2020-01-17 ENCOUNTER — ASOB RESULT (OUTPATIENT)
Age: 30
End: 2020-01-17

## 2020-01-17 ENCOUNTER — APPOINTMENT (OUTPATIENT)
Dept: MATERNAL FETAL MEDICINE | Facility: CLINIC | Age: 30
End: 2020-01-17
Payer: COMMERCIAL

## 2020-01-17 PROCEDURE — G0108 DIAB MANAGE TRN  PER INDIV: CPT

## 2020-01-27 ENCOUNTER — APPOINTMENT (OUTPATIENT)
Dept: OBGYN | Facility: CLINIC | Age: 30
End: 2020-01-27
Payer: COMMERCIAL

## 2020-01-27 VITALS
BODY MASS INDEX: 26.87 KG/M2 | SYSTOLIC BLOOD PRESSURE: 102 MMHG | HEIGHT: 62 IN | DIASTOLIC BLOOD PRESSURE: 70 MMHG | WEIGHT: 146 LBS

## 2020-01-27 PROCEDURE — 0502F SUBSEQUENT PRENATAL CARE: CPT

## 2020-01-28 ENCOUNTER — APPOINTMENT (OUTPATIENT)
Dept: ANTEPARTUM | Facility: CLINIC | Age: 30
End: 2020-01-28
Payer: COMMERCIAL

## 2020-01-28 ENCOUNTER — ASOB RESULT (OUTPATIENT)
Age: 30
End: 2020-01-28

## 2020-01-28 PROCEDURE — 76816 OB US FOLLOW-UP PER FETUS: CPT

## 2020-01-29 ENCOUNTER — OTHER (OUTPATIENT)
Age: 30
End: 2020-01-29

## 2020-01-30 ENCOUNTER — OUTPATIENT (OUTPATIENT)
Dept: OUTPATIENT SERVICES | Facility: HOSPITAL | Age: 30
LOS: 1 days | End: 2020-01-30
Payer: COMMERCIAL

## 2020-01-30 DIAGNOSIS — Z3A.00 WEEKS OF GESTATION OF PREGNANCY NOT SPECIFIED: ICD-10-CM

## 2020-01-30 DIAGNOSIS — O26.899 OTHER SPECIFIED PREGNANCY RELATED CONDITIONS, UNSPECIFIED TRIMESTER: ICD-10-CM

## 2020-01-30 LAB
GP B STREP DNA SPEC QL NAA+PROBE: NORMAL
GP B STREP DNA SPEC QL NAA+PROBE: NOT DETECTED
SOURCE GBS: NORMAL

## 2020-01-30 PROCEDURE — G0463: CPT

## 2020-01-30 NOTE — CONSULT NOTE ADULT - SUBJECTIVE AND OBJECTIVE BOX
Dear Dr. Chau,    I had the pleasure of seeing Ms. Srivastava  in consultation today.  As you know, she is a  at 36 weeks gestation.  She presents today with a chief complaint of lumbar and thoracic scoliosis, S/P thoracic fusion at the age of 10.                                                    .          Past Medical History: scoliosis                                  hypothyroidism                                  anemia during pregnancy                                  gestational diabetes (this pregnancy    Past Surgical History: S/P thoracic spinal fusion    Family History of Anesthetic Problems:    Medications: Levothyroxine 25 mcg                      vitamin D3 2,000 IU                      prenatal vitamin                      ferrous sulfate daily  Allergies: NKDA    Physical Examination:  Height:   5ft 2in        Weight:  145            Airway: Mallampati 2     Impression:  This is a 30 y/o F  3, para 1, S/P blighted ovum, EDC 20, who presents to PST today to discuss her options for neuraxial anesthesia prior to delivery.  I explained to her the risks/benefits of epidural, spinal and general anesthesia in the setting of labor and or .  Ms. Srivastava had an uneventful vaginal delivery 5 years ago, and did not require an epidural or spinal, as she reports having no pain.  The plan is for her to deliver vaginally this time, as well.  It was explained to her that scoliosis does not prohibit her from receiving neuraxial anesthesia; however, it may be a little more difficult to place the epidural catheter due to the curvature, the hardware from the fusion is in the thoracic region, and so should not interfere with neuraxial anesthesia .  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 She did not have any further questions regarding anesthesia.  The final decision regarding neuraxial anesthesia will be left to the on call team Dear Dr. Chau,    I had the pleasure of seeing Ms. Srivastava  in consultation today.  As you know, she is a  at 36 weeks gestation.  She presents today with a chief complaint of lumbar and thoracic scoliosis and had a thoracic fusion at the age of 10.    Past Medical History: scoliosis                                  hypothyroidism                                  anemia during pregnancy                                  gestational diabetes (this pregnancy    Past Surgical History: Thoracic spinal fusion    Family History of Anesthetic Problems: Denies    Medications: Levothyroxine 25 mcg                      vitamin D3 2,000 IU                      prenatal vitamin                      ferrous sulfate daily    Allergies: NKDA    Physical Examination:  Height:   5ft 2in      Weight:  145       Airway: Mallampati 2, good mouth opening, 3 FB TM distance, full neck ROM    Impression:  This is a 29 year old  3, para 1 lady s/p blighted ovum, EDC 20 who presents to PST today to discuss her options for neuraxial anesthesia prior to delivery.  I explained to her the risks/benefits of epidural, spinal and general anesthesia in the setting of labor and .  Ms. Srivastava had an uneventful vaginal delivery 5 years ago and did not require an epidural or spinal, as she reports having minimal pain.  The plan is for her to deliver vaginally for this pregnancy.  It was explained to her that scoliosis does not prohibit her from receiving neuraxial anesthesia; however, it may be more difficult to place the epidural catheter due to the curvature.  The hardware from the fusion is in the thoracic region, and so should not interfere with placement of lumbar neuraxial anesthesia; however, even successful placement may result in uneven analgesia or "windows" due to post-surgical scarring that can affect local anesthetic spread.  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 She did not have any further questions regarding anesthesia.  The final decision regarding neuraxial anesthesia will be left to the on call team.    Sincerely,  Corbin Romano M.D.

## 2020-01-31 ENCOUNTER — ASOB RESULT (OUTPATIENT)
Age: 30
End: 2020-01-31

## 2020-01-31 ENCOUNTER — APPOINTMENT (OUTPATIENT)
Dept: ANTEPARTUM | Facility: CLINIC | Age: 30
End: 2020-01-31
Payer: COMMERCIAL

## 2020-01-31 PROCEDURE — 76819 FETAL BIOPHYS PROFIL W/O NST: CPT

## 2020-02-04 ENCOUNTER — APPOINTMENT (OUTPATIENT)
Dept: OBGYN | Facility: CLINIC | Age: 30
End: 2020-02-04
Payer: COMMERCIAL

## 2020-02-04 VITALS
DIASTOLIC BLOOD PRESSURE: 60 MMHG | WEIGHT: 147 LBS | SYSTOLIC BLOOD PRESSURE: 108 MMHG | HEIGHT: 62 IN | BODY MASS INDEX: 27.05 KG/M2

## 2020-02-04 PROCEDURE — 0502F SUBSEQUENT PRENATAL CARE: CPT

## 2020-02-05 ENCOUNTER — OUTPATIENT (OUTPATIENT)
Dept: OUTPATIENT SERVICES | Facility: HOSPITAL | Age: 30
LOS: 1 days | End: 2020-02-05
Payer: COMMERCIAL

## 2020-02-05 DIAGNOSIS — O26.899 OTHER SPECIFIED PREGNANCY RELATED CONDITIONS, UNSPECIFIED TRIMESTER: ICD-10-CM

## 2020-02-05 DIAGNOSIS — Z3A.00 WEEKS OF GESTATION OF PREGNANCY NOT SPECIFIED: ICD-10-CM

## 2020-02-05 PROCEDURE — 59025 FETAL NON-STRESS TEST: CPT

## 2020-02-05 PROCEDURE — G0463: CPT

## 2020-02-05 PROCEDURE — 59025 FETAL NON-STRESS TEST: CPT | Mod: 26

## 2020-02-06 ENCOUNTER — APPOINTMENT (OUTPATIENT)
Dept: OBGYN | Facility: CLINIC | Age: 30
End: 2020-02-06
Payer: COMMERCIAL

## 2020-02-06 VITALS
BODY MASS INDEX: 27.23 KG/M2 | SYSTOLIC BLOOD PRESSURE: 112 MMHG | WEIGHT: 148 LBS | HEIGHT: 62 IN | DIASTOLIC BLOOD PRESSURE: 70 MMHG

## 2020-02-06 PROCEDURE — 0502F SUBSEQUENT PRENATAL CARE: CPT

## 2020-02-08 ENCOUNTER — OUTPATIENT (OUTPATIENT)
Dept: OUTPATIENT SERVICES | Facility: HOSPITAL | Age: 30
LOS: 1 days | End: 2020-02-08
Payer: COMMERCIAL

## 2020-02-08 DIAGNOSIS — O26.899 OTHER SPECIFIED PREGNANCY RELATED CONDITIONS, UNSPECIFIED TRIMESTER: ICD-10-CM

## 2020-02-08 DIAGNOSIS — Z3A.00 WEEKS OF GESTATION OF PREGNANCY NOT SPECIFIED: ICD-10-CM

## 2020-02-08 PROCEDURE — G0463: CPT

## 2020-02-08 PROCEDURE — 59025 FETAL NON-STRESS TEST: CPT | Mod: 26

## 2020-02-08 PROCEDURE — 59025 FETAL NON-STRESS TEST: CPT

## 2020-02-11 ENCOUNTER — APPOINTMENT (OUTPATIENT)
Dept: OBGYN | Facility: CLINIC | Age: 30
End: 2020-02-11
Payer: COMMERCIAL

## 2020-02-11 VITALS
SYSTOLIC BLOOD PRESSURE: 116 MMHG | DIASTOLIC BLOOD PRESSURE: 82 MMHG | HEIGHT: 62 IN | BODY MASS INDEX: 27.16 KG/M2 | WEIGHT: 147.6 LBS

## 2020-02-11 PROCEDURE — 0502F SUBSEQUENT PRENATAL CARE: CPT

## 2020-02-12 ENCOUNTER — NON-APPOINTMENT (OUTPATIENT)
Age: 30
End: 2020-02-12

## 2020-02-13 ENCOUNTER — APPOINTMENT (OUTPATIENT)
Dept: OBGYN | Facility: CLINIC | Age: 30
End: 2020-02-13
Payer: COMMERCIAL

## 2020-02-13 ENCOUNTER — NON-APPOINTMENT (OUTPATIENT)
Age: 30
End: 2020-02-13

## 2020-02-13 VITALS
WEIGHT: 147.25 LBS | BODY MASS INDEX: 27.1 KG/M2 | HEIGHT: 62 IN | SYSTOLIC BLOOD PRESSURE: 110 MMHG | DIASTOLIC BLOOD PRESSURE: 70 MMHG

## 2020-02-13 PROCEDURE — 0502F SUBSEQUENT PRENATAL CARE: CPT

## 2020-02-14 ENCOUNTER — INPATIENT (INPATIENT)
Facility: HOSPITAL | Age: 30
LOS: 2 days | Discharge: HOME | End: 2020-02-17
Attending: OBSTETRICS & GYNECOLOGY | Admitting: OBSTETRICS & GYNECOLOGY
Payer: COMMERCIAL

## 2020-02-14 DIAGNOSIS — Z3A.00 WEEKS OF GESTATION OF PREGNANCY NOT SPECIFIED: ICD-10-CM

## 2020-02-14 DIAGNOSIS — O26.899 OTHER SPECIFIED PREGNANCY RELATED CONDITIONS, UNSPECIFIED TRIMESTER: ICD-10-CM

## 2020-02-15 VITALS
DIASTOLIC BLOOD PRESSURE: 64 MMHG | SYSTOLIC BLOOD PRESSURE: 124 MMHG | RESPIRATION RATE: 17 BRPM | OXYGEN SATURATION: 100 % | HEART RATE: 69 BPM

## 2020-02-15 DIAGNOSIS — Z34.80 ENCOUNTER FOR SUPERVISION OF OTHER NORMAL PREGNANCY, UNSPECIFIED TRIMESTER: ICD-10-CM

## 2020-02-15 LAB
BASOPHILS # BLD AUTO: 0.04 K/UL — SIGNIFICANT CHANGE UP (ref 0–0.2)
BASOPHILS NFR BLD AUTO: 0.4 % — SIGNIFICANT CHANGE UP (ref 0–2)
BLD GP AB SCN SERPL QL: NEGATIVE — SIGNIFICANT CHANGE UP
EOSINOPHIL # BLD AUTO: 0.07 K/UL — SIGNIFICANT CHANGE UP (ref 0–0.5)
EOSINOPHIL NFR BLD AUTO: 0.7 % — SIGNIFICANT CHANGE UP (ref 0–6)
GLUCOSE BLDC GLUCOMTR-MCNC: 103 MG/DL — HIGH (ref 70–99)
GLUCOSE BLDC GLUCOMTR-MCNC: 73 MG/DL — SIGNIFICANT CHANGE UP (ref 70–99)
HCT VFR BLD CALC: 36.3 % — SIGNIFICANT CHANGE UP (ref 34.5–45)
HGB BLD-MCNC: 11.8 G/DL — SIGNIFICANT CHANGE UP (ref 11.5–15.5)
IMM GRANULOCYTES NFR BLD AUTO: 0.7 % — SIGNIFICANT CHANGE UP (ref 0–1.5)
LYMPHOCYTES # BLD AUTO: 2.51 K/UL — SIGNIFICANT CHANGE UP (ref 1–3.3)
LYMPHOCYTES # BLD AUTO: 24.3 % — SIGNIFICANT CHANGE UP (ref 13–44)
MCHC RBC-ENTMCNC: 30.9 PG — SIGNIFICANT CHANGE UP (ref 27–34)
MCHC RBC-ENTMCNC: 32.5 GM/DL — SIGNIFICANT CHANGE UP (ref 32–36)
MCV RBC AUTO: 95 FL — SIGNIFICANT CHANGE UP (ref 80–100)
MONOCYTES # BLD AUTO: 0.85 K/UL — SIGNIFICANT CHANGE UP (ref 0–0.9)
MONOCYTES NFR BLD AUTO: 8.2 % — SIGNIFICANT CHANGE UP (ref 2–14)
NEUTROPHILS # BLD AUTO: 6.78 K/UL — SIGNIFICANT CHANGE UP (ref 1.8–7.4)
NEUTROPHILS NFR BLD AUTO: 65.7 % — SIGNIFICANT CHANGE UP (ref 43–77)
NRBC # BLD: 0 /100 WBCS — SIGNIFICANT CHANGE UP (ref 0–0)
PLATELET # BLD AUTO: 177 K/UL — SIGNIFICANT CHANGE UP (ref 150–400)
RBC # BLD: 3.82 M/UL — SIGNIFICANT CHANGE UP (ref 3.8–5.2)
RBC # FLD: 13.8 % — SIGNIFICANT CHANGE UP (ref 10.3–14.5)
RH IG SCN BLD-IMP: POSITIVE — SIGNIFICANT CHANGE UP
T PALLIDUM AB TITR SER: NEGATIVE — SIGNIFICANT CHANGE UP
WBC # BLD: 10.32 K/UL — SIGNIFICANT CHANGE UP (ref 3.8–10.5)
WBC # FLD AUTO: 10.32 K/UL — SIGNIFICANT CHANGE UP (ref 3.8–10.5)

## 2020-02-15 PROCEDURE — 88307 TISSUE EXAM BY PATHOLOGIST: CPT | Mod: 26

## 2020-02-15 PROCEDURE — 59400 OBSTETRICAL CARE: CPT

## 2020-02-15 RX ORDER — DIBUCAINE 1 %
1 OINTMENT (GRAM) RECTAL EVERY 6 HOURS
Refills: 0 | Status: DISCONTINUED | OUTPATIENT
Start: 2020-02-15 | End: 2020-02-17

## 2020-02-15 RX ORDER — SIMETHICONE 80 MG/1
80 TABLET, CHEWABLE ORAL EVERY 4 HOURS
Refills: 0 | Status: DISCONTINUED | OUTPATIENT
Start: 2020-02-15 | End: 2020-02-17

## 2020-02-15 RX ORDER — IBUPROFEN 200 MG
600 TABLET ORAL EVERY 6 HOURS
Refills: 0 | Status: COMPLETED | OUTPATIENT
Start: 2020-02-15 | End: 2021-01-13

## 2020-02-15 RX ORDER — OXYTOCIN 10 UNIT/ML
333.33 VIAL (ML) INJECTION
Qty: 20 | Refills: 0 | Status: DISCONTINUED | OUTPATIENT
Start: 2020-02-15 | End: 2020-02-17

## 2020-02-15 RX ORDER — SODIUM CHLORIDE 9 MG/ML
1000 INJECTION INTRAMUSCULAR; INTRAVENOUS; SUBCUTANEOUS
Refills: 0 | Status: DISCONTINUED | OUTPATIENT
Start: 2020-02-15 | End: 2020-02-15

## 2020-02-15 RX ORDER — AER TRAVELER 0.5 G/1
1 SOLUTION RECTAL; TOPICAL EVERY 4 HOURS
Refills: 0 | Status: DISCONTINUED | OUTPATIENT
Start: 2020-02-15 | End: 2020-02-17

## 2020-02-15 RX ORDER — PRAMOXINE HYDROCHLORIDE 150 MG/15G
1 AEROSOL, FOAM RECTAL EVERY 4 HOURS
Refills: 0 | Status: DISCONTINUED | OUTPATIENT
Start: 2020-02-15 | End: 2020-02-17

## 2020-02-15 RX ORDER — DIPHENHYDRAMINE HCL 50 MG
25 CAPSULE ORAL EVERY 6 HOURS
Refills: 0 | Status: DISCONTINUED | OUTPATIENT
Start: 2020-02-15 | End: 2020-02-17

## 2020-02-15 RX ORDER — OXYCODONE HYDROCHLORIDE 5 MG/1
5 TABLET ORAL
Refills: 0 | Status: DISCONTINUED | OUTPATIENT
Start: 2020-02-15 | End: 2020-02-17

## 2020-02-15 RX ORDER — LANOLIN
1 OINTMENT (GRAM) TOPICAL EVERY 6 HOURS
Refills: 0 | Status: DISCONTINUED | OUTPATIENT
Start: 2020-02-15 | End: 2020-02-17

## 2020-02-15 RX ORDER — ACETAMINOPHEN 500 MG
975 TABLET ORAL
Refills: 0 | Status: DISCONTINUED | OUTPATIENT
Start: 2020-02-15 | End: 2020-02-17

## 2020-02-15 RX ORDER — CITRIC ACID/SODIUM CITRATE 300-500 MG
15 SOLUTION, ORAL ORAL EVERY 6 HOURS
Refills: 0 | Status: DISCONTINUED | OUTPATIENT
Start: 2020-02-15 | End: 2020-02-15

## 2020-02-15 RX ORDER — IBUPROFEN 200 MG
600 TABLET ORAL EVERY 6 HOURS
Refills: 0 | Status: DISCONTINUED | OUTPATIENT
Start: 2020-02-15 | End: 2020-02-17

## 2020-02-15 RX ORDER — TETANUS TOXOID, REDUCED DIPHTHERIA TOXOID AND ACELLULAR PERTUSSIS VACCINE, ADSORBED 5; 2.5; 8; 8; 2.5 [IU]/.5ML; [IU]/.5ML; UG/.5ML; UG/.5ML; UG/.5ML
0.5 SUSPENSION INTRAMUSCULAR ONCE
Refills: 0 | Status: DISCONTINUED | OUTPATIENT
Start: 2020-02-15 | End: 2020-02-17

## 2020-02-15 RX ORDER — SODIUM CHLORIDE 9 MG/ML
3 INJECTION INTRAMUSCULAR; INTRAVENOUS; SUBCUTANEOUS EVERY 8 HOURS
Refills: 0 | Status: DISCONTINUED | OUTPATIENT
Start: 2020-02-15 | End: 2020-02-17

## 2020-02-15 RX ORDER — HYDROCORTISONE 1 %
1 OINTMENT (GRAM) TOPICAL EVERY 6 HOURS
Refills: 0 | Status: DISCONTINUED | OUTPATIENT
Start: 2020-02-15 | End: 2020-02-17

## 2020-02-15 RX ORDER — OXYTOCIN 10 UNIT/ML
2 VIAL (ML) INJECTION
Qty: 30 | Refills: 0 | Status: DISCONTINUED | OUTPATIENT
Start: 2020-02-15 | End: 2020-02-17

## 2020-02-15 RX ORDER — SODIUM CHLORIDE 9 MG/ML
1000 INJECTION, SOLUTION INTRAVENOUS
Refills: 0 | Status: DISCONTINUED | OUTPATIENT
Start: 2020-02-15 | End: 2020-02-15

## 2020-02-15 RX ORDER — LEVOTHYROXINE SODIUM 125 MCG
25 TABLET ORAL DAILY
Refills: 0 | Status: DISCONTINUED | OUTPATIENT
Start: 2020-02-15 | End: 2020-02-17

## 2020-02-15 RX ORDER — ACETAMINOPHEN 500 MG
1000 TABLET ORAL ONCE
Refills: 0 | Status: COMPLETED | OUTPATIENT
Start: 2020-02-15 | End: 2020-02-15

## 2020-02-15 RX ORDER — MAGNESIUM HYDROXIDE 400 MG/1
30 TABLET, CHEWABLE ORAL
Refills: 0 | Status: DISCONTINUED | OUTPATIENT
Start: 2020-02-15 | End: 2020-02-17

## 2020-02-15 RX ORDER — GLYCERIN ADULT
1 SUPPOSITORY, RECTAL RECTAL AT BEDTIME
Refills: 0 | Status: DISCONTINUED | OUTPATIENT
Start: 2020-02-15 | End: 2020-02-17

## 2020-02-15 RX ORDER — BENZOCAINE 10 %
1 GEL (GRAM) MUCOUS MEMBRANE EVERY 6 HOURS
Refills: 0 | Status: DISCONTINUED | OUTPATIENT
Start: 2020-02-15 | End: 2020-02-17

## 2020-02-15 RX ORDER — OXYCODONE HYDROCHLORIDE 5 MG/1
5 TABLET ORAL ONCE
Refills: 0 | Status: DISCONTINUED | OUTPATIENT
Start: 2020-02-15 | End: 2020-02-17

## 2020-02-15 RX ORDER — KETOROLAC TROMETHAMINE 30 MG/ML
30 SYRINGE (ML) INJECTION ONCE
Refills: 0 | Status: DISCONTINUED | OUTPATIENT
Start: 2020-02-15 | End: 2020-02-17

## 2020-02-15 RX ADMIN — Medication 0.2 MILLIGRAM(S): at 17:00

## 2020-02-15 RX ADMIN — Medication 400 MILLIGRAM(S): at 07:22

## 2020-02-15 RX ADMIN — Medication 25 MICROGRAM(S): at 17:01

## 2020-02-15 RX ADMIN — Medication 0.2 MILLIGRAM(S): at 08:00

## 2020-02-15 RX ADMIN — Medication 0.2 MILLIGRAM(S): at 12:54

## 2020-02-15 RX ADMIN — Medication 2 MILLIUNIT(S)/MIN: at 03:47

## 2020-02-15 RX ADMIN — Medication 1 TABLET(S): at 13:01

## 2020-02-15 RX ADMIN — Medication 0.2 MILLIGRAM(S): at 21:29

## 2020-02-16 LAB
HCT VFR BLD CALC: 32.2 % — LOW (ref 34.5–45)
HGB BLD-MCNC: 10.7 G/DL — LOW (ref 11.5–15.5)

## 2020-02-16 RX ADMIN — Medication 975 MILLIGRAM(S): at 08:44

## 2020-02-16 RX ADMIN — Medication 975 MILLIGRAM(S): at 22:34

## 2020-02-16 RX ADMIN — Medication 975 MILLIGRAM(S): at 23:05

## 2020-02-16 RX ADMIN — Medication 975 MILLIGRAM(S): at 09:44

## 2020-02-16 RX ADMIN — Medication 1 TABLET(S): at 14:14

## 2020-02-16 RX ADMIN — Medication 25 MICROGRAM(S): at 05:45

## 2020-02-16 RX ADMIN — Medication 0.2 MILLIGRAM(S): at 05:45

## 2020-02-16 RX ADMIN — Medication 0.2 MILLIGRAM(S): at 01:35

## 2020-02-16 NOTE — PROGRESS NOTE ADULT - SUBJECTIVE AND OBJECTIVE BOX
PPD#1- ATTENDING NOTE  OUSMANE BLOUNT  MRN-[id number]  30y    Patient is a 30y old  Female  S/P   PPD#1 (2020 04:42)      S: Patient doing well. Minimal lochia. +feels uterine contractions, on the methergine series, Pain is controlled. +Breastfeeding  Baby is out of the NICU. No HA, no N/V,    O: Vital Signs Last 24 Hrs  T(C): 36.4 (2020 06:59), Max: 36.6 (15 Feb 2020 17:28)  T(F): 97.6 (2020 06:59), Max: 97.9 (15 Feb 2020 17:28)  HR: 75 (2020 06:59) (73 - 92)  BP: 123/85 (2020 06:59) (117/74 - 126/85)  BP(mean): --  RR: 18 (2020 06:59) (18 - 18)  SpO2: 98% (2020 06:59) (98% - 100%)    Gen: NAD  Abd: soft, NT, ND, fundus firm below umbilicus  Lochia: moderate  Ext: no tenderness, no hyper reflexia    Labs:                        10.7   x     )-----------( x        ( 2020 07:29 )             32.2       A/P: 30y PPD#1 s/p . Patient stable   Pt on methergine series postpartum, BP within normal range.   Analgesia prn, Motrin and Tylenol  Regular diet  Plan for discharge on POD#2    Pura Perdue MD  Office Number [phone]  PPD#1- ATTENDING NOTE  OUSMANE BLOUNT  MRN-[id number]  30y    Patient is a 30y old  Female  S/P   PPD#1 (2020 04:42)      S: Patient doing well. Minimal lochia. +feels uterine contractions, on the methergine series, Pain is controlled. +Breastfeeding  Baby is out of the NICU. No HA, no N/V,    O: Vital Signs Last 24 Hrs  T(C): 36.4 (2020 06:59), Max: 36.6 (15 Feb 2020 17:28)  T(F): 97.6 (2020 06:59), Max: 97.9 (15 Feb 2020 17:28)  HR: 75 (2020 06:59) (73 - 92)  BP: 123/85 (2020 06:59) (117/74 - 126/85)  BP(mean): --  RR: 18 (2020 06:59) (18 - 18)  SpO2: 98% (2020 06:59) (98% - 100%)    Gen: NAD  Abd: soft, NT, ND, fundus firm below umbilicus  Lochia: moderate  Ext: no tenderness    Labs:                        10.7   x     )-----------( x        ( 2020 07:29 )             32.2       A/P: 30y PPD#1 s/p . Patient stable   Pt on methergine series postpartum, BP within normal range.   Analgesia prn, Motrin and Tylenol  Regular diet  Plan for discharge on POD#2    Pura Perdue MD  Office Number [phone]

## 2020-02-16 NOTE — DIETITIAN INITIAL EVALUATION ADULT. - PERTINENT MEDS FT
MEDICATIONS  (STANDING):  acetaminophen   Tablet .. 975 milliGRAM(s) Oral <User Schedule>  diphtheria/tetanus/pertussis (acellular) Vaccine (ADAcel) 0.5 milliLiter(s) IntraMuscular once  ibuprofen  Tablet. 600 milliGRAM(s) Oral every 6 hours  ketorolac   Injectable 30 milliGRAM(s) IV Push once  levothyroxine 25 MICROGram(s) Oral daily  oxytocin Infusion 333.333 milliUNIT(s)/Min (1000 mL/Hr) IV Continuous <Continuous>  oxytocin Infusion 333.333 milliUNIT(s)/Min (1000 mL/Hr) IV Continuous <Continuous>  oxytocin Infusion 2 milliUNIT(s)/Min (2 mL/Hr) IV Continuous <Continuous>  prenatal multivitamin 1 Tablet(s) Oral daily  sodium chloride 0.9% lock flush 3 milliLiter(s) IV Push every 8 hours    MEDICATIONS  (PRN):  benzocaine 20%/menthol 0.5% Spray 1 Spray(s) Topical every 6 hours PRN for Perineal discomfort  dibucaine 1% Ointment 1 Application(s) Topical every 6 hours PRN Perineal discomfort  diphenhydrAMINE 25 milliGRAM(s) Oral every 6 hours PRN Pruritus  glycerin Suppository - Adult 1 Suppository(s) Rectal at bedtime PRN Constipation  hydrocortisone 1% Cream 1 Application(s) Topical every 6 hours PRN Moderate Pain (4-6)  lanolin Ointment 1 Application(s) Topical every 6 hours PRN nipple soreness  magnesium hydroxide Suspension 30 milliLiter(s) Oral two times a day PRN Constipation  oxyCODONE    IR 5 milliGRAM(s) Oral every 3 hours PRN Moderate to Severe Pain (4-10)  oxyCODONE    IR 5 milliGRAM(s) Oral once PRN Moderate to Severe Pain (4-10)  pramoxine 1%/zinc 5% Cream 1 Application(s) Topical every 4 hours PRN Moderate Pain (4-6)  simethicone 80 milliGRAM(s) Chew every 4 hours PRN Gas  witch hazel Pads 1 Application(s) Topical every 4 hours PRN Perineal discomfort

## 2020-02-16 NOTE — DIETITIAN INITIAL EVALUATION ADULT. - REASON INDICATOR FOR ASSESSMENT
Nutrition consult received for postpartum GDM.  Information obtained from: pt, electronic medical record

## 2020-02-16 NOTE — DIETITIAN INITIAL EVALUATION ADULT. - PHYSICAL APPEARANCE
other (specify)/well nourished Height: 62 inches, Weight: 125 pounds (pre-pregnancy wt per pt)  BMI: 22.9 kg/m2 IBW: 110 pounds (+/-10%), %IBW: 114%  Pertinent Info: No edema noted, no pressure injuries noted at this time in nursing flow sheet.

## 2020-02-16 NOTE — PROGRESS NOTE ADULT - SUBJECTIVE AND OBJECTIVE BOX
OB Progress Note:  PPD#1    S: 31yo  PPD#1 s/p . Patient feels well. Pain is well controlled. She is tolerating a regular diet and passing flatus. She is voiding spontaneously, and ambulating without difficulty. Denies CP/SOB. Denies lightheadedness/dizziness. Denies N/V. Denies sxs of PEC: denies headache, visual disturbances, RUQ pain, respiratory distress    O:  Vitals:  Vital Signs Last 24 Hrs  T(C): 36.2 (15 Feb 2020 21:37), Max: 36.9 (15 Feb 2020 08:20)  T(F): 97.2 (15 Feb 2020 21:37), Max: 98.4 (15 Feb 2020 08:20)  HR: 73 (15 Feb 2020 21:37) (64 - 92)  BP: 117/74 (15 Feb 2020 21:37) (117/74 - 142/81)  BP(mean): 98 (15 Feb 2020 06:50) (98 - 103)  RR: 18 (15 Feb 2020 21:37) (17 - 18)  SpO2: 100% (15 Feb 2020 21:37) (99% - 100%)    MEDICATIONS  (STANDING):  acetaminophen   Tablet .. 975 milliGRAM(s) Oral <User Schedule>  diphtheria/tetanus/pertussis (acellular) Vaccine (ADAcel) 0.5 milliLiter(s) IntraMuscular once  ibuprofen  Tablet. 600 milliGRAM(s) Oral every 6 hours  ketorolac   Injectable 30 milliGRAM(s) IV Push once  levothyroxine 25 MICROGram(s) Oral daily  methylergonovine 0.2 milliGRAM(s) Oral every 4 hours  oxytocin Infusion 333.333 milliUNIT(s)/Min (1000 mL/Hr) IV Continuous <Continuous>  oxytocin Infusion 333.333 milliUNIT(s)/Min (1000 mL/Hr) IV Continuous <Continuous>  oxytocin Infusion 2 milliUNIT(s)/Min (2 mL/Hr) IV Continuous <Continuous>  prenatal multivitamin 1 Tablet(s) Oral daily  sodium chloride 0.9% lock flush 3 milliLiter(s) IV Push every 8 hours      Labs:  Blood type: O Positive  Rubella IgG: RPR: Negative                          11.8   10.32 >-----------< 177    ( 02-15 @ 00:42 )             36.3        Physical Exam:  General: NAD  Abdomen: soft, non-tender, non-distended, fundus firm  Vaginal: Lochia wnl  Extremities: No erythema/edema

## 2020-02-16 NOTE — DIETITIAN INITIAL EVALUATION ADULT. - OTHER INFO
Pt is 29yo  s/p  at 39.0 weeks. Plans on breast feeding  male.  Good appetite reported. No c/o nausea, vomiting, diarrhea, or constipation. No BM since delivery.  Pt endorses following a CSTCHOGDM therapeutic diet while pregnant. Endorses taking insulin PRN for BG control during pregnancy.  Pt endorses taking a prenatal multivitamin while pregnant. NFKA reported. Endorses [id number] food preference.    Nutrition education provided: Pt educated on postpartum dietary recommendations including: risk of development of T2DM, ways to reduce risk of developing T2DM and reinforced importance of DM screening 4-12 weeks postpartum. Pt verbalized good understanding. Written materials left at bedside.

## 2020-02-16 NOTE — DIETITIAN INITIAL EVALUATION ADULT. - ADD RECOMMEND
1. Continue to provide current diet order, no therapeutic diet restrictions indicated at this time. 2. Provide/review nutrition education as indicated 3. Will continue to monitor nutrient intake, wt, labs, f/u per protocol

## 2020-02-16 NOTE — PROGRESS NOTE ADULT - PROBLEM SELECTOR PLAN 1
- Pain well controlled, continue current pain regimen  - Increase ambulation, SCDs when not ambulating  - Continue regular diet    Iftikhar Molina PGY1

## 2020-02-17 ENCOUNTER — TRANSCRIPTION ENCOUNTER (OUTPATIENT)
Age: 30
End: 2020-02-17

## 2020-02-17 VITALS
DIASTOLIC BLOOD PRESSURE: 79 MMHG | RESPIRATION RATE: 18 BRPM | HEART RATE: 75 BPM | SYSTOLIC BLOOD PRESSURE: 121 MMHG | TEMPERATURE: 98 F | OXYGEN SATURATION: 97 %

## 2020-02-17 PROCEDURE — 59025 FETAL NON-STRESS TEST: CPT

## 2020-02-17 PROCEDURE — 59050 FETAL MONITOR W/REPORT: CPT

## 2020-02-17 PROCEDURE — 86901 BLOOD TYPING SEROLOGIC RH(D): CPT

## 2020-02-17 PROCEDURE — 85018 HEMOGLOBIN: CPT

## 2020-02-17 PROCEDURE — 88307 TISSUE EXAM BY PATHOLOGIST: CPT

## 2020-02-17 PROCEDURE — 86850 RBC ANTIBODY SCREEN: CPT

## 2020-02-17 PROCEDURE — 85027 COMPLETE CBC AUTOMATED: CPT

## 2020-02-17 PROCEDURE — 85014 HEMATOCRIT: CPT

## 2020-02-17 PROCEDURE — 82962 GLUCOSE BLOOD TEST: CPT

## 2020-02-17 PROCEDURE — 86780 TREPONEMA PALLIDUM: CPT

## 2020-02-17 PROCEDURE — G0463: CPT

## 2020-02-17 PROCEDURE — 86900 BLOOD TYPING SEROLOGIC ABO: CPT

## 2020-02-17 RX ORDER — IBUPROFEN 200 MG
1 TABLET ORAL
Qty: 0 | Refills: 0 | DISCHARGE
Start: 2020-02-17

## 2020-02-17 RX ORDER — ACETAMINOPHEN 500 MG
3 TABLET ORAL
Qty: 0 | Refills: 0 | DISCHARGE
Start: 2020-02-17

## 2020-02-17 RX ADMIN — Medication 25 MICROGRAM(S): at 06:19

## 2020-02-17 RX ADMIN — Medication 975 MILLIGRAM(S): at 09:07

## 2020-02-17 RX ADMIN — Medication 975 MILLIGRAM(S): at 10:07

## 2020-02-17 RX ADMIN — Medication 1 TABLET(S): at 11:36

## 2020-02-17 NOTE — DISCHARGE NOTE OB - HOSPITAL COURSE
uncomplicated vaginal delivery and routine postpartum course uncomplicated vaginal delivery and routine postpartum course                        10.7   x     )-----------( x        ( 16 Feb 2020 07:29 )             32.2

## 2020-02-17 NOTE — DISCHARGE NOTE OB - CARE PLAN
Principal Discharge DX:	Vaginal delivery  Goal:	routine postpartum recovery  Assessment and plan of treatment:	no heavy lifting/nothing in vagina x 6 weeks. call doctor/hospital if fever >101, increased pain, inability to hold down food or drink or signs of wound infection or separation.

## 2020-02-17 NOTE — DISCHARGE NOTE OB - CARE PROVIDER_API CALL
Haleigh Chau)  OBSN  General  [address] [address]  Phone: [phone]  Fax: [phone]  Follow Up Time:

## 2020-02-17 NOTE — DISCHARGE NOTE OB - PATIENT PORTAL LINK FT
You can access the FollowMyHealth Patient Portal offered by [facility] by registering at the following website: [email or web address]. By joining [employer]’s FollowMyHealth portal, you will also be able to view your health information using other applications (apps) compatible with our system.

## 2020-02-17 NOTE — PROGRESS NOTE ADULT - ATTENDING COMMENTS
Patient doing well, no complaints, out of bed.   No HA, CP, SOB  No heavy vaginal bleeding (VB)/normal lochia    ICU Vital Signs Last 24 Hrs  T(C): 36.6 (17 Feb 2020 06:44), Max: 36.6 (16 Feb 2020 17:42)  HR: 75 (17 Feb 2020 06:44) (75 - 88)  BP: 121/79 (17 Feb 2020 06:44) (108/74 - 121/79)  RR: 18 (17 Feb 2020 06:44) (18 - 18)  SpO2: 97% (17 Feb 2020 06:44) (97% - 97%)                          10.7   x     )-----------( x        ( 16 Feb 2020 07:29 )             32.2     PPD # 2 stable  vaginal delivery  Patient seen and evaluated by me. I agree with resident note unless otherwise stated.   Routine postpartum care, regular diet as tolerated, ambulate and pain control as needed.     JIL Chau MD  Attending

## 2020-02-17 NOTE — PROGRESS NOTE ADULT - PROBLEM SELECTOR PLAN 1
Plan:   - Increase ambulation, SCDs when not ambulating  - Continue regular diet  - Continue PO pain regimen: Ibuprofen, acetaminophen, oxycodone PRN   - Discharge planning      Kasey, PGY-1

## 2020-02-17 NOTE — PROGRESS NOTE ADULT - SUBJECTIVE AND OBJECTIVE BOX
31yo now PPD2 after  seen and examined at bedside, no acute overnight events. Patient denies current complaints, her pain is well controlled. States she is ambulating, voiding spontaneously, passing gas, and tolerating regular diet. Denies CP, SOB, N/V, HA, blurred vision, epigastric pain.    Vital Signs Last 24 Hours  T(C): 36.6 (20 @ 17:42), Max: 36.6 (20 @ 17:42)  HR: 88 (20 @ 17:42) (75 - 88)  BP: 108/74 (20 @ 17:42) (108/74 - 123/85)  RR: 18 (20 @ 17:42) (18 - 18)  SpO2: 98% (20 @ 06:59) (98% - 98%)    Physical Exam:  General: NAD, resting comfortably in bed   Abdomen: Soft, non-tender, non-distended, fundus firm  Extremities: Full ROM, moving all extremities spontaneously  Pelvic: Lochia wnl    Labs:    Blood Type: O Positive  Antibody Screen: Negative  RPR: Negative               10.7   x     )-----------( x        ( 16 @ 07:29 )             32.2                11.8   10.32 )-----------( 177      ( 15 @ 00:42 )             36.3         MEDICATIONS  (STANDING):  acetaminophen   Tablet .. 975 milliGRAM(s) Oral <User Schedule>  diphtheria/tetanus/pertussis (acellular) Vaccine (ADAcel) 0.5 milliLiter(s) IntraMuscular once  ibuprofen  Tablet. 600 milliGRAM(s) Oral every 6 hours  ketorolac   Injectable 30 milliGRAM(s) IV Push once  levothyroxine 25 MICROGram(s) Oral daily  oxytocin Infusion 333.333 milliUNIT(s)/Min (1000 mL/Hr) IV Continuous <Continuous>  oxytocin Infusion 333.333 milliUNIT(s)/Min (1000 mL/Hr) IV Continuous <Continuous>  oxytocin Infusion 2 milliUNIT(s)/Min (2 mL/Hr) IV Continuous <Continuous>  prenatal multivitamin 1 Tablet(s) Oral daily  sodium chloride 0.9% lock flush 3 milliLiter(s) IV Push every 8 hours    MEDICATIONS  (PRN):  benzocaine 20%/menthol 0.5% Spray 1 Spray(s) Topical every 6 hours PRN for Perineal discomfort  dibucaine 1% Ointment 1 Application(s) Topical every 6 hours PRN Perineal discomfort  diphenhydrAMINE 25 milliGRAM(s) Oral every 6 hours PRN Pruritus  glycerin Suppository - Adult 1 Suppository(s) Rectal at bedtime PRN Constipation  hydrocortisone 1% Cream 1 Application(s) Topical every 6 hours PRN Moderate Pain (4-6)  lanolin Ointment 1 Application(s) Topical every 6 hours PRN nipple soreness  magnesium hydroxide Suspension 30 milliLiter(s) Oral two times a day PRN Constipation  oxyCODONE    IR 5 milliGRAM(s) Oral every 3 hours PRN Moderate to Severe Pain (4-10)  oxyCODONE    IR 5 milliGRAM(s) Oral once PRN Moderate to Severe Pain (4-10)  pramoxine 1%/zinc 5% Cream 1 Application(s) Topical every 4 hours PRN Moderate Pain (4-6)  simethicone 80 milliGRAM(s) Chew every 4 hours PRN Gas  witch hazel Pads 1 Application(s) Topical every 4 hours PRN Perineal discomfort

## 2020-02-17 NOTE — DISCHARGE NOTE OB - PLAN OF CARE
routine postpartum recovery no heavy lifting/nothing in vagina x 6 weeks. call doctor/hospital if fever >101, increased pain, inability to hold down food or drink or signs of wound infection or separation.

## 2020-02-18 ENCOUNTER — APPOINTMENT (OUTPATIENT)
Dept: ANTEPARTUM | Facility: CLINIC | Age: 30
End: 2020-02-18

## 2020-02-18 LAB — GLUCOSE BLDC GLUCOMTR-MCNC: 124 MG/DL — HIGH (ref 70–99)

## 2020-02-19 ENCOUNTER — APPOINTMENT (OUTPATIENT)
Dept: OBGYN | Facility: CLINIC | Age: 30
End: 2020-02-19

## 2020-02-25 LAB — SURGICAL PATHOLOGY STUDY: SIGNIFICANT CHANGE UP

## 2020-03-13 ENCOUNTER — APPOINTMENT (OUTPATIENT)
Dept: OBGYN | Facility: CLINIC | Age: 30
End: 2020-03-13

## 2020-03-19 ENCOUNTER — APPOINTMENT (OUTPATIENT)
Dept: OBGYN | Facility: CLINIC | Age: 30
End: 2020-03-19

## 2020-03-19 VITALS
BODY MASS INDEX: 24.48 KG/M2 | HEIGHT: 62 IN | WEIGHT: 133 LBS | DIASTOLIC BLOOD PRESSURE: 64 MMHG | SYSTOLIC BLOOD PRESSURE: 100 MMHG

## 2020-03-19 DIAGNOSIS — Z86.32 PERSONAL HISTORY OF GESTATIONAL DIABETES: ICD-10-CM

## 2020-03-19 DIAGNOSIS — O24.419 GESTATIONAL DIABETES MELLITUS IN PREGNANCY, UNSPECIFIED CONTROL: ICD-10-CM

## 2020-03-19 NOTE — HISTORY OF PRESENT ILLNESS
[Postpartum Follow Up] : postpartum follow up [Complications:___] : complications include: [unfilled] [Delivery Date: ___] : on [unfilled] [] : delivered by vaginal delivery [Breastfeeding] : currently nursing [Intended Contraception] : Intended Contraception: [Condoms] : condoms [Back to Normal] : is back to normal in size [None] : no vaginal bleeding [Normal] : the vagina was normal [Examination Of The Breasts] : breasts are normal [Doing Well] : is doing well [BTL] : no tubal ligation [BF with Difficulty] : nursing without difficulty [Resumed Menses] : has not resumed her menses [Resumed [place]] : has not resumed intercourse [S/Sx PP Depression] : no signs/symptoms of postpartum depression [Healing Well] : is not healing well [Cervix Sample Taken] : cervical sample not taken for a Pap smear [de-identified] : Postpartum 2 hour glucose screen in two weeks, Patient cleared to return to work  [de-identified] : f/u for annual exam

## 2020-07-20 ENCOUNTER — APPOINTMENT (OUTPATIENT)
Dept: OBGYN | Facility: CLINIC | Age: 30
End: 2020-07-20

## 2020-07-21 ENCOUNTER — APPOINTMENT (OUTPATIENT)
Dept: OBGYN | Facility: CLINIC | Age: 30
End: 2020-07-21
Payer: COMMERCIAL

## 2020-07-21 PROCEDURE — 99213 OFFICE O/P EST LOW 20 MIN: CPT | Mod: 95

## 2020-07-21 NOTE — HISTORY OF PRESENT ILLNESS
[Home] : at home, [unfilled] , at the time of the visit. [Medical Office: ([facility])___] : at the medical office located in  [Verbal consent obtained from patient] : the patient, [unfilled]

## 2020-09-15 ENCOUNTER — APPOINTMENT (OUTPATIENT)
Dept: OBGYN | Facility: CLINIC | Age: 30
End: 2020-09-15
Payer: COMMERCIAL

## 2020-09-15 VITALS
SYSTOLIC BLOOD PRESSURE: 118 MMHG | BODY MASS INDEX: 21.71 KG/M2 | WEIGHT: 118 LBS | HEIGHT: 62 IN | DIASTOLIC BLOOD PRESSURE: 77 MMHG

## 2020-09-15 DIAGNOSIS — Z30.430 ENCOUNTER FOR INSERTION OF INTRAUTERINE CONTRACEPTIVE DEVICE: ICD-10-CM

## 2020-09-15 LAB — HCG UR QL: NEGATIVE

## 2020-09-15 PROCEDURE — 81025 URINE PREGNANCY TEST: CPT

## 2020-09-15 PROCEDURE — 76830 TRANSVAGINAL US NON-OB: CPT

## 2020-09-15 PROCEDURE — 58300 INSERT INTRAUTERINE DEVICE: CPT

## 2020-09-15 NOTE — PROCEDURE
[IUD Placement] : intrauterine device (IUD) placement [Time out performed] : Pre-procedure time out performed.  Patient's name, date of birth and procedure confirmed. [Consent Obtained] : Consent obtained [Risks] : risks [Prevention of Pregnancy] : prevention of pregnancy [Benefits] : benefits [Patient] : patient [Neg Pregnancy Test] : negative pregnancy test [No Premedication] : No premedication [History of Unprotected [place]] : no history of unprotected intercourse [Tenaculum] : Tenaculum [Easy Passage] : Easy passage [Sounded to ___ cm] : sounded to [unfilled] ~Ucm [Mirena IUD] : Mirena IUD [Post Placement Transvag. US] : post placement transvaginal ultrasound [Tolerated Well] : Patient tolerated the procedure well [None] : None [No Complications] : No complications [de-identified] : [id number] [LMPDate] : amenorrheic breastfeeding [de-identified] : 10/2022 [de-identified] : 9/2025

## 2020-09-17 LAB
C TRACH RRNA SPEC QL NAA+PROBE: NOT DETECTED
N GONORRHOEA RRNA SPEC QL NAA+PROBE: NOT DETECTED
SOURCE AMPLIFICATION: NORMAL

## 2020-11-12 ENCOUNTER — APPOINTMENT (OUTPATIENT)
Dept: OBGYN | Facility: CLINIC | Age: 30
End: 2020-11-12

## 2022-01-01 NOTE — DISCHARGE NOTE OB - NS AS DC FOLLOWUP INST YN
brought to postpartum room 205 in mother's arms. Bands checked and report given to Pamela SHEETS.    Yes

## 2022-01-06 ENCOUNTER — APPOINTMENT (OUTPATIENT)
Dept: OBGYN | Facility: CLINIC | Age: 32
End: 2022-01-06

## 2022-12-19 ENCOUNTER — APPOINTMENT (OUTPATIENT)
Dept: OBGYN | Facility: CLINIC | Age: 32
End: 2022-12-19

## 2023-02-16 ENCOUNTER — APPOINTMENT (OUTPATIENT)
Dept: OBGYN | Facility: CLINIC | Age: 33
End: 2023-02-16

## 2023-04-24 ENCOUNTER — APPOINTMENT (OUTPATIENT)
Dept: PULMONOLOGY | Facility: CLINIC | Age: 33
End: 2023-04-24
Payer: COMMERCIAL

## 2023-04-24 VITALS
SYSTOLIC BLOOD PRESSURE: 113 MMHG | WEIGHT: 119 LBS | DIASTOLIC BLOOD PRESSURE: 75 MMHG | HEART RATE: 78 BPM | BODY MASS INDEX: 22.47 KG/M2 | HEIGHT: 61 IN

## 2023-04-24 DIAGNOSIS — J45.909 UNSPECIFIED ASTHMA, UNCOMPLICATED: ICD-10-CM

## 2023-04-24 PROCEDURE — 94726 PLETHYSMOGRAPHY LUNG VOLUMES: CPT

## 2023-04-24 PROCEDURE — ZZZZZ: CPT

## 2023-04-24 PROCEDURE — 94010 BREATHING CAPACITY TEST: CPT

## 2023-04-24 PROCEDURE — 99204 OFFICE O/P NEW MOD 45 MIN: CPT

## 2023-04-24 PROCEDURE — 94729 DIFFUSING CAPACITY: CPT

## 2023-04-24 NOTE — REVIEW OF SYSTEMS
[Cough] : cough [Wheezing] : no wheezing [SOB on Exertion] : no sob on exertion [Hay Fever] : no hay fever [Negative] : Cardiovascular

## 2023-04-24 NOTE — ASSESSMENT
[FreeTextEntry1] : 32-year-old female here for evaluation of asthma.\par No significant obstruction noted on PFTs but patient has symptoms of cough variant asthma.\par We will check methacholine challenge test and FeNO.\par She can continue Flovent twice daily for now. \par Advised to hold inhaler use for 7 days leading up to the testing.\par Check CBC with differential, IgE, and allergen panel.\par \par Follow-up after above results are available.\par

## 2023-04-24 NOTE — PHYSICAL EXAM
[No Acute Distress] : no acute distress [Normal Oropharynx] : normal oropharynx [I] : Mallampati Class: I [Normal Appearance] : normal appearance [No Neck Mass] : no neck mass [Normal Rate/Rhythm] : normal rate/rhythm [Normal S1, S2] : normal s1, s2 [No Murmurs] : no murmurs [No Resp Distress] : no resp distress [Clear to Auscultation Bilaterally] : clear to auscultation bilaterally [No Abnormalities] : no abnormalities [Normal Gait] : normal gait [No Clubbing] : no clubbing [No Cyanosis] : no cyanosis [No Edema] : no edema [FROM] : FROM [Normal Color/ Pigmentation] : normal color/ pigmentation [Oriented x3] : oriented x3 [No Focal Deficits] : no focal deficits [Normal Affect] : normal affect

## 2023-04-24 NOTE — HISTORY OF PRESENT ILLNESS
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 She is also using Flovent. With the most latest episode about 4 weeks prior she tried Symbicort up to 160/4.5 but gave her palpitations and also required a course of steroids.  She also reports that her throat felt like it was closing up on one occasion. She is back down to using Flovent twice daily.  Her symptoms upper respiratory infections.  Otherwise is not bothered by the heat, humidity, weather.  Denies wheezing, shortness of breath, allergic rhinitis.\par \par Her father also has asthma.\par

## 2023-05-08 ENCOUNTER — APPOINTMENT (OUTPATIENT)
Dept: OBGYN | Facility: CLINIC | Age: 33
End: 2023-05-08

## 2023-10-13 ENCOUNTER — APPOINTMENT (OUTPATIENT)
Dept: OBGYN | Facility: CLINIC | Age: 33
End: 2023-10-13
Payer: COMMERCIAL

## 2023-10-13 DIAGNOSIS — Z97.5 EXCESSIVE AND FREQUENT MENSTRUATION WITH IRREGULAR CYCLE: ICD-10-CM

## 2023-10-13 DIAGNOSIS — T83.32XA DISPLACEMENT OF INTRAUTERINE CONTRACEPTIVE DEVICE, INITIAL ENCOUNTER: ICD-10-CM

## 2023-10-13 DIAGNOSIS — N92.1 EXCESSIVE AND FREQUENT MENSTRUATION WITH IRREGULAR CYCLE: ICD-10-CM

## 2023-10-13 PROCEDURE — 76857 US EXAM PELVIC LIMITED: CPT

## 2023-10-13 PROCEDURE — 99214 OFFICE O/P EST MOD 30 MIN: CPT

## 2023-10-13 RX ORDER — NORGESTIMATE AND ETHINYL ESTRADIOL 0.25-0.035
0.25-35 KIT ORAL
Qty: 1 | Refills: 0 | Status: ACTIVE | COMMUNITY
Start: 2023-10-13 | End: 1900-01-01

## 2023-11-17 ENCOUNTER — APPOINTMENT (OUTPATIENT)
Dept: OBGYN | Facility: CLINIC | Age: 33
End: 2023-11-17

## 2024-04-04 ENCOUNTER — APPOINTMENT (OUTPATIENT)
Dept: OBGYN | Facility: CLINIC | Age: 34
End: 2024-04-04
Payer: COMMERCIAL

## 2024-04-04 VITALS
SYSTOLIC BLOOD PRESSURE: 125 MMHG | WEIGHT: 122 LBS | DIASTOLIC BLOOD PRESSURE: 85 MMHG | HEIGHT: 61 IN | BODY MASS INDEX: 23.03 KG/M2

## 2024-04-04 DIAGNOSIS — N92.6 IRREGULAR MENSTRUATION, UNSPECIFIED: ICD-10-CM

## 2024-04-04 DIAGNOSIS — Z34.92 ENCOUNTER FOR SUPERVISION OF NORMAL PREGNANCY, UNSPECIFIED, SECOND TRIMESTER: ICD-10-CM

## 2024-04-04 DIAGNOSIS — Z34.93 ENCOUNTER FOR SUPERVISION OF NORMAL PREGNANCY, UNSPECIFIED, THIRD TRIMESTER: ICD-10-CM

## 2024-04-04 DIAGNOSIS — O24.419 GESTATIONAL DIABETES MELLITUS IN PREGNANCY, UNSPECIFIED CONTROL: ICD-10-CM

## 2024-04-04 DIAGNOSIS — Z01.419 ENCOUNTER FOR GYNECOLOGICAL EXAMINATION (GENERAL) (ROUTINE) W/OUT ABNORMAL FINDINGS: ICD-10-CM

## 2024-04-04 DIAGNOSIS — Z30.9 ENCOUNTER FOR CONTRACEPTIVE MANAGEMENT, UNSPECIFIED: ICD-10-CM

## 2024-04-04 PROCEDURE — 99395 PREV VISIT EST AGE 18-39: CPT

## 2024-04-04 NOTE — HISTORY OF PRESENT ILLNESS
[FreeTextEntry1] : 33yo P2o12 Mirena IUD  here for annual exam   x 2 breakthrough bleeding resolved though she just took the ocp x 1 month last month   note occ pinching pain at 6 o'clock

## 2024-04-04 NOTE — PLAN
[FreeTextEntry1] : 35yo well woman doing well  note small cut @ 6  o'clock stressed increased self lub or KY jelly r/o infection that may be contributing observe action or passion   f/u pap

## 2024-04-05 LAB
CANDIDA VAG CYTO: NOT DETECTED
G VAGINALIS+PREV SP MTYP VAG QL MICRO: NOT DETECTED
HPV HIGH+LOW RISK DNA PNL CVX: NOT DETECTED
T VAGINALIS VAG QL WET PREP: NOT DETECTED

## 2024-04-09 LAB — CYTOLOGY CVX/VAG DOC THIN PREP: NORMAL
